# Patient Record
Sex: MALE | Race: WHITE | Employment: OTHER | URBAN - METROPOLITAN AREA
[De-identification: names, ages, dates, MRNs, and addresses within clinical notes are randomized per-mention and may not be internally consistent; named-entity substitution may affect disease eponyms.]

---

## 2017-06-30 ENCOUNTER — HOSPITAL ENCOUNTER (OUTPATIENT)
Age: 72
Setting detail: OUTPATIENT SURGERY
Discharge: HOME | End: 2017-06-30
Attending: INTERNAL MEDICINE | Admitting: INTERNAL MEDICINE
Payer: MEDICARE

## 2017-06-30 ENCOUNTER — ANESTHESIA (OUTPATIENT)
Dept: ENDOSCOPY | Age: 72
End: 2017-06-30
Payer: MEDICARE

## 2017-06-30 ENCOUNTER — ANESTHESIA EVENT (OUTPATIENT)
Dept: ENDOSCOPY | Age: 72
End: 2017-06-30
Payer: MEDICARE

## 2017-06-30 VITALS
TEMPERATURE: 98.1 F | HEIGHT: 63 IN | WEIGHT: 122.2 LBS | OXYGEN SATURATION: 98 % | SYSTOLIC BLOOD PRESSURE: 118 MMHG | HEART RATE: 70 BPM | RESPIRATION RATE: 24 BRPM | DIASTOLIC BLOOD PRESSURE: 70 MMHG | BODY MASS INDEX: 21.65 KG/M2

## 2017-06-30 PROCEDURE — 76060000031 HC ANESTHESIA FIRST 0.5 HR: Performed by: INTERNAL MEDICINE

## 2017-06-30 PROCEDURE — 77030013992 HC SNR POLYP ENDOSC BSC -B: Performed by: INTERNAL MEDICINE

## 2017-06-30 PROCEDURE — 88305 TISSUE EXAM BY PATHOLOGIST: CPT | Performed by: INTERNAL MEDICINE

## 2017-06-30 PROCEDURE — 74011250636 HC RX REV CODE- 250/636

## 2017-06-30 PROCEDURE — 76040000019: Performed by: INTERNAL MEDICINE

## 2017-06-30 PROCEDURE — 74011000250 HC RX REV CODE- 250

## 2017-06-30 PROCEDURE — 74011250636 HC RX REV CODE- 250/636: Performed by: INTERNAL MEDICINE

## 2017-06-30 RX ORDER — MIDAZOLAM HYDROCHLORIDE 1 MG/ML
.25-5 INJECTION, SOLUTION INTRAMUSCULAR; INTRAVENOUS
Status: DISCONTINUED | OUTPATIENT
Start: 2017-06-30 | End: 2017-06-30 | Stop reason: HOSPADM

## 2017-06-30 RX ORDER — ATROPINE SULFATE 0.1 MG/ML
0.5 INJECTION INTRAVENOUS
Status: DISCONTINUED | OUTPATIENT
Start: 2017-06-30 | End: 2017-06-30 | Stop reason: HOSPADM

## 2017-06-30 RX ORDER — NALOXONE HYDROCHLORIDE 0.4 MG/ML
0.4 INJECTION, SOLUTION INTRAMUSCULAR; INTRAVENOUS; SUBCUTANEOUS
Status: DISCONTINUED | OUTPATIENT
Start: 2017-06-30 | End: 2017-06-30 | Stop reason: HOSPADM

## 2017-06-30 RX ORDER — SODIUM CHLORIDE 0.9 % (FLUSH) 0.9 %
5-10 SYRINGE (ML) INJECTION EVERY 8 HOURS
Status: DISCONTINUED | OUTPATIENT
Start: 2017-06-30 | End: 2017-06-30 | Stop reason: HOSPADM

## 2017-06-30 RX ORDER — SODIUM CHLORIDE 0.9 % (FLUSH) 0.9 %
5-10 SYRINGE (ML) INJECTION AS NEEDED
Status: DISCONTINUED | OUTPATIENT
Start: 2017-06-30 | End: 2017-06-30 | Stop reason: HOSPADM

## 2017-06-30 RX ORDER — LIDOCAINE HYDROCHLORIDE 20 MG/ML
INJECTION, SOLUTION EPIDURAL; INFILTRATION; INTRACAUDAL; PERINEURAL AS NEEDED
Status: DISCONTINUED | OUTPATIENT
Start: 2017-06-30 | End: 2017-06-30 | Stop reason: HOSPADM

## 2017-06-30 RX ORDER — SODIUM CHLORIDE 9 MG/ML
75 INJECTION, SOLUTION INTRAVENOUS CONTINUOUS
Status: DISCONTINUED | OUTPATIENT
Start: 2017-06-30 | End: 2017-06-30 | Stop reason: HOSPADM

## 2017-06-30 RX ORDER — FLUMAZENIL 0.1 MG/ML
0.2 INJECTION INTRAVENOUS
Status: DISCONTINUED | OUTPATIENT
Start: 2017-06-30 | End: 2017-06-30 | Stop reason: HOSPADM

## 2017-06-30 RX ORDER — EPINEPHRINE 0.1 MG/ML
1 INJECTION INTRACARDIAC; INTRAVENOUS
Status: DISCONTINUED | OUTPATIENT
Start: 2017-06-30 | End: 2017-06-30 | Stop reason: HOSPADM

## 2017-06-30 RX ORDER — PROPOFOL 10 MG/ML
INJECTION, EMULSION INTRAVENOUS AS NEEDED
Status: DISCONTINUED | OUTPATIENT
Start: 2017-06-30 | End: 2017-06-30 | Stop reason: HOSPADM

## 2017-06-30 RX ORDER — DEXTROMETHORPHAN/PSEUDOEPHED 2.5-7.5/.8
1.2 DROPS ORAL
Status: DISCONTINUED | OUTPATIENT
Start: 2017-06-30 | End: 2017-06-30 | Stop reason: HOSPADM

## 2017-06-30 RX ADMIN — PROPOFOL 240 MG: 10 INJECTION, EMULSION INTRAVENOUS at 15:50

## 2017-06-30 RX ADMIN — LIDOCAINE HYDROCHLORIDE 40 MG: 20 INJECTION, SOLUTION EPIDURAL; INFILTRATION; INTRACAUDAL; PERINEURAL at 15:33

## 2017-06-30 RX ADMIN — SODIUM CHLORIDE 75 ML/HR: 900 INJECTION, SOLUTION INTRAVENOUS at 15:01

## 2017-06-30 NOTE — IP AVS SNAPSHOT
[address] [facility] 
[phone] Patient: Sarah Worthington MRN: [id number] :1945 You are allergic to the following Allergen Reactions Codeine Other (comments) Keeps him awake Recent Documentation Height Weight BMI Smoking Status 1.6 m 55.4 kg 21.65 kg/m2 Former Smoker Emergency Contacts Name Discharge Info Relation Home Work Mobile Jenny Villareal DISCHARGE CAREGIVER [3] Spouse [3]   [phone] About your hospitalization You were admitted on:  2017 You last received care in the:  [facility] ENDOSCOPY You were discharged on:  2017 Unit phone number:  [phone] Why you were hospitalized Your primary diagnosis was:  Not on File Providers Seen During Your Hospitalizations Provider Role Specialty Primary office phone Gracy Pathak MD Attending Provider Gastroenterology [phone] Your Primary Care Physician (PCP) Primary Care Physician Office Phone Office Fax Rola Jamison [phone] Follow-up Information None Current Discharge Medication List  
  
CONTINUE these medications which have NOT CHANGED Dose & Instructions Dispensing Information Comments Morning Noon Evening Bedtime  
 albuterol 90 mcg/actuation inhaler Commonly known as:  PROVENTIL HFA, VENTOLIN HFA, PROAIR HFA Your last dose was: Your next dose is:    
   
   
 Dose:  1 Puff Take 1 Puff by inhalation every four (4) hours as needed for Wheezing. Refills:  0  
     
   
   
   
  
 fluticasone-salmeterol 500-50 mcg/dose diskus inhaler Commonly known as:  ADVAIR Your last dose was: Your next dose is:    
   
   
 Dose:  1 Puff Take 1 Puff by inhalation every twelve (12) hours. Refills:  0  
     
   
   
   
  
 lisinopril 10 mg tablet Commonly known as:  Bhakti Husbands Your last dose was: Your next dose is:    
   
   
 Dose:  10 mg Take 10 mg by mouth daily. Refills:  0 MUCINEX DM PO Your last dose was: Your next dose is: Take  by mouth daily. Refills:  0  
     
   
   
   
  
 pravastatin 20 mg tablet Commonly known as:  PRAVACHOL Your last dose was: Your next dose is:    
   
   
 Dose:  20 mg Take 20 mg by mouth nightly. Refills:  0  
     
   
   
   
  
 primidone 250 mg tablet Commonly known as: MYSOLINE Your last dose was: Your next dose is:    
   
   
 Dose:  1000 mg Take 1,000 mg by mouth daily. Refills:  0 SINGULAIR 10 mg tablet Generic drug:  montelukast  
   
Your last dose was: Your next dose is:    
   
   
 Dose:  10 mg Take 10 mg by mouth daily. Refills:  0  
     
   
   
   
  
 tiotropium 18 mcg inhalation capsule Commonly known as:  Christella Distel Your last dose was: Your next dose is:    
   
   
 Dose:  1 Cap Take 1 Cap by inhalation daily. Refills:  0 Discharge Instructions [place] Office: [phone] [facility] 
[id number] 
1945 EGD/COLONOSCOPY DISCHARGE INSTRUCTIONS Discomfort: 
 
redness at IV site- apply warm compress to area; if redness or soreness persist- contact your physician Gaseous discomfort- walking, belching will help relieve any discomfort You may not operate a vehicle for 12 hours You may not engage in an occupation involving machinery or appliances for rest of today. You may not drink alcoholic beverages for at least 12 hours Avoid making any critical decisions for at least 24 hour DIET You may resume your regular diet  however -  remember your colon is empty and a heavy meal will produce gas. Avoid these foods:  fried / greasy foods, excessive carbonated drinks or too much caffeine MEDICATIONS Regarding Aspirin or Nonsteroidal medications specifically, please see below. ACTIVITY You may resume your normal daily activities. Spend the remainder of the day resting -  avoid any strenuous activity. CALL M.D. ANY SIGN OF Increasing pain, nausea, vomiting Abdominal distension (swelling) New increased bleeding (oral or rectal) Fever (chills) You may not take any Advil, Aspirin, Ibuprofen, Motrin, Aleve, or Goodys for 7 days, ONLY  Tylenol as needed for pain. Follow-up Instructions: 
 Call  Kael Arango MD for any questions or concerns Results of procedure / biopsy in 7 days Telephone # [phone] Follow-up Information None MailPix[employer] Activation Thank you for requesting access to ArriveBefore. Please follow the instructions below to securely access and download your online medical record. ArriveBefore allows you to send messages to your doctor, view your test results, renew your prescriptions, schedule appointments, and more. How Do I Sign Up? 1. In your internet browser, go to [email or web address] 
2. Click on the First Time User? Click Here link in the Sign In box. You will be redirect to the New Member Sign Up page. 3. Enter your ArriveBefore Access Code exactly as it appears below. You will not need to use this code after youve completed the sign-up process. If you do not sign up before the expiration date, you must request a new code. ArriveBefore Access Code: [email or web address] Expires: 2017  2:27 PM (This is the date your ArriveBefore access code will ) 4. Enter the last four digits of your Social Security Number (xxxx) and Date of Birth (mm/dd/yyyy) as indicated and click Submit. You will be taken to the next sign-up page. 5. Create a ArriveBefore ID. This will be your ArriveBefore login ID and cannot be changed, so think of one that is secure and easy to remember. 6. Create a [employer] password. You can change your password at any time. 7. Enter your Password Reset Question and Answer. This can be used at a later time if you forget your password. 8. Enter your e-mail address. You will receive e-mail notification when new information is available in [address] 9. Click Sign Up. You can now view and download portions of your medical record. 10. Click the Download Summary menu link to download a portable copy of your medical information. Additional Information If you have questions, please visit the Frequently Asked Questions section of the [employer] website at [email or web address] Remember, [employer] is NOT to be used for urgent needs. For medical emergencies, dial 911. Discharge Orders None Introducing [facility]! Paulo Abraham introduces [employer] patient portal. Now you can access parts of your medical record, email your doctor's office, and request medication refills online. 1. In your internet browser, go to [email or web address] 2. Click on the First Time User? Click Here link in the Sign In box. You will see the New Member Sign Up page. 3. Enter your [employer] Access Code exactly as it appears below. You will not need to use this code after youve completed the sign-up process. If you do not sign up before the expiration date, you must request a new code. · [employer] Access Code: [email or web address] Expires: 9/28/2017  2:27 PM 
 
4. Enter the last four digits of your Social Security Number (xxxx) and Date of Birth (mm/dd/yyyy) as indicated and click Submit. You will be taken to the next sign-up page. 5. Create a [employer] ID. This will be your [employer] login ID and cannot be changed, so think of one that is secure and easy to remember. 6. Create a [employer] password. You can change your password at any time. 7. Enter your Password Reset Question and Answer.  This can be used at a later time if you forget your password. 8. Enter your e-mail address. You will receive e-mail notification when new information is available in [address] 9. Click Sign Up. You can now view and download portions of your medical record. 10. Click the Download Summary menu link to download a portable copy of your medical information. If you have questions, please visit the Frequently Asked Questions section of the [employer] website. Remember, [employer] is NOT to be used for urgent needs. For medical emergencies, dial 911. Now available from your iPhone and Android! General Information Please provide this summary of care documentation to your next provider. Patient Signature:  ____________________________________________________________ Date:  ____________________________________________________________  
  
[employer] Provider Signature:  ____________________________________________________________ Date:  ____________________________________________________________

## 2017-06-30 NOTE — ANESTHESIA POSTPROCEDURE EVALUATION
Post-Anesthesia Evaluation and Assessment    Patient: Srinivas Gay MRN: [id number]  SSN: [id number]    YOB: 1945  Age: 80 y.o. Sex: male       Cardiovascular Function/Vital Signs  Visit Vitals    /83    Pulse 75    Temp 36.7 °C (98.1 °F)    Resp 24    Ht 5' 3\" (1.6 m)    Wt 55.4 kg (122 lb 3.2 oz)    SpO2 98%    BMI 21.65 kg/m2       Patient is status post total IV anesthesia anesthesia for Procedure(s):  COLONOSCOPY  ENDOSCOPIC POLYPECTOMY. Nausea/Vomiting: None    Postoperative hydration reviewed and adequate. Pain:  Pain Scale 1: Visual (06/30/17 1610)  Pain Intensity 1: 0 (06/30/17 1610)   Managed    Neurological Status: At baseline    Mental Status and Level of Consciousness: Arousable    Pulmonary Status:   O2 Device: Room air (06/30/17 1610)   Adequate oxygenation and airway patent    Complications related to anesthesia: None    Post-anesthesia assessment completed.  No concerns    Signed By: Ana Hollingsworth DO     June 30, 2017

## 2017-06-30 NOTE — DISCHARGE INSTRUCTIONS
[place] Office: [phone]    [place]  [id number]  1945    EGD/COLONOSCOPY DISCHARGE INSTRUCTIONS  Discomfort:    redness at IV site- apply warm compress to area; if redness or soreness persist- contact your physician  Gaseous discomfort- walking, belching will help relieve any discomfort  You may not operate a vehicle for 12 hours  You may not engage in an occupation involving machinery or appliances for rest of today. You may not drink alcoholic beverages for at least 12 hours  Avoid making any critical decisions for at least 24 hour  DIET  You may resume your regular diet - however -  remember your colon is empty and a heavy meal will produce gas. Avoid these foods:  fried / greasy foods, excessive carbonated drinks or too much caffeine  MEDICATIONS   Regarding Aspirin or Nonsteroidal medications specifically, please see below. ACTIVITY  You may resume your normal daily activities. Spend the remainder of the day resting -  avoid any strenuous activity. CALL M.D. ANY SIGN OF   Increasing pain, nausea, vomiting  Abdominal distension (swelling)  New increased bleeding (oral or rectal)  Fever (chills)    You may not take any Advil, Aspirin, Ibuprofen, Motrin, Aleve, or Goodys for 7 days, ONLY  Tylenol as needed for pain. Follow-up Instructions:   Call  Mubashir A. Jerris Sever, MD for any questions or concerns  Results of procedure / biopsy in 7 days   Telephone # [phone]      Follow-up Information     None      MediaSpike Activation    Thank you for requesting access to MediaSpike. Please follow the instructions below to securely access and download your online medical record. MediaSpike allows you to send messages to your doctor, view your test results, renew your prescriptions, schedule appointments, and more. How Do I Sign Up? 1. In your internet browser, go to [email or web address]  2. Click on the First Time User? Click Here link in the Sign In box.  You will be redirect to the New Member Sign Up page. 3. Enter your ReliOn Access Code exactly as it appears below. You will not need to use this code after youve completed the sign-up process. If you do not sign up before the expiration date, you must request a new code. ReliOn Access Code: [email or web address]  Expires: 2017  2:27 PM (This is the date your ReliOn access code will )    4. Enter the last four digits of your Social Security Number (xxxx) and Date of Birth (mm/dd/yyyy) as indicated and click Submit. You will be taken to the next sign-up page. 5. Create a ReliOn ID. This will be your ReliOn login ID and cannot be changed, so think of one that is secure and easy to remember. 6. Create a ReliOn password. You can change your password at any time. 7. Enter your Password Reset Question and Answer. This can be used at a later time if you forget your password. 8. Enter your e-mail address. You will receive e-mail notification when new information is available in [address] 9. Click Sign Up. You can now view and download portions of your medical record. 10. Click the Download Summary menu link to download a portable copy of your medical information. Additional Information    If you have questions, please visit the Frequently Asked Questions section of the ReliOn website at [email or web address] Remember, ReliOn is NOT to be used for urgent needs. For medical emergencies, dial 911.

## 2017-06-30 NOTE — ANESTHESIA PREPROCEDURE EVALUATION
Anesthetic History   No history of anesthetic complications            Review of Systems / Medical History  Patient summary reviewed, nursing notes reviewed and pertinent labs reviewed    Pulmonary    COPD: severe      Smoker (Former smoker, quit in 2005)      Comments: Hx of chronic resp failure with hypoxia, home oxygen 2 L, does not use unless needed   Neuro/Psych   Within defined limits           Cardiovascular    Hypertension          CAD and hyperlipidemia    Exercise tolerance: <4 METS  Comments: 4-12-16 EKG:  NSR, LAD    Denies any chest pain or hrt prob recently.   Sees Cardiologist regularly   GI/Hepatic/Renal               Comments: Occult blood Endo/Other  Within defined limits           Other Findings            Physical Exam    Airway  Mallampati: I  TM Distance: 4 - 6 cm  Neck ROM: normal range of motion   Mouth opening: Normal     Cardiovascular    Rhythm: regular  Rate: normal         Dental    Dentition: Full upper dentures and Lower partial plate  Comments: Lower plate snaps in place   Pulmonary  Breath sounds clear to auscultation               Abdominal  GI exam deferred       Other Findings            Anesthetic Plan    ASA: 3  Anesthesia type: total IV anesthesia          Induction: Intravenous  Anesthetic plan and risks discussed with: Patient      Took BP med and used inhalers today

## 2017-06-30 NOTE — PROCEDURES
Colonoscopy Procedure Note    Nate Elizabeth Sr.  1945  [id number]    Indications:    Occult blood in stool     Post-operative Diagnosis:  Colon polyps, internal hemorrhoids, diverticulosis    : Mubashir A. Melody Baumgarten, MD    Referring Provider: Kathrine Berg MD    Sedation:  MAC anesthesia Propofol        Procedure Details:    After detailed informed consent was obtained with all risks and benefits of procedure explained and preoperative exam completed, the patient was taken to the endoscopy suite and placed in the left lateral decubitus position. Upon sequential sedation as per above, a digital rectal exam was performed  And was normal.  The Olympus videocolonoscope  was inserted in the rectum and carefully advanced to the cecum, which was identified by the ileocecal valve and appendiceal orifice. The quality of preparation was good. The colonoscope was slowly withdrawn with careful evaluation between folds. Retroflexion in the rectum could not be performed(short rectum). Findings:     · A 1 cm transverse colon sessile polyp was snared off with heat. · A 5 mm cecal polyp was removed with a hot snare  · An 8 mm descending colon polyp was removed with a hot snare. · Severe sigmoid diverticulosis is noted  · Large internal hemorrhoids  · TI was normal      Therapies:  3 complete polypectomy were performed using hot snare  and the polyps were  retrieved    Specimen:  Specimens were collected as described above and sent to pathology. Complications: None were encountered during the procedure. EBL:  None. Recommendations:     -Await pathology. -Repeat colonoscopy in 3 years.  -Annual FOB Testing suggested  -Follow up with primary care physician.  -For new bleeding, unexplained weight loss,change in bowel habits and anemia, an earlier colonoscopy should be considered. Mubashir A. Melody Baumgarten, MD  6/30/2017  3:52 PM

## 2017-06-30 NOTE — H&P
Pre-endoscopy H and P    The patient was seen and examined in the room/pre-op holding area. The airway was assessed and documented. The problem list, past medical history, and medications were reviewed. Patient Active Problem List   Diagnosis Code    Pulmonary hypertension ([facility] I27.2    ASHD (arteriosclerotic heart disease) I25.10    Chronic airway obstruction, not elsewhere classified J44.9    Acute pancreatitis K85.90    HTN (hypertension) I10    COPD (chronic obstructive pulmonary disease) ([facility] J44.9    Chronic respiratory failure with hypoxia (HCC) J96.11    Hyperlipidemia E78.5     Social History     Social History    Marital status:      Spouse name: N/A    Number of children: N/A    Years of education: N/A     Occupational History    Not on file. Social History Main Topics    Smoking status: Former Smoker     Packs/day: 1.00     Quit date: 6/29/2005    Smokeless tobacco: Never Used    Alcohol use No    Drug use: No    Sexual activity: Not on file     Other Topics Concern    Not on file     Social History Narrative     Past Medical History:   Diagnosis Date    COPD     Hypertension     Ill-defined condition     oxygen as needed--2 liters         Prior to Admission Medications   Prescriptions Last Dose Informant Patient Reported? Taking? GUAIFENESIN/DEXTROMETHORPHAN (MUCINEX DM PO) Unknown at Unknown time  Yes No   Sig: Take  by mouth daily. albuterol (PROVENTIL HFA, VENTOLIN HFA, PROAIR HFA) 90 mcg/actuation inhaler 6/23/2017 at Unknown time  Yes Yes   Sig: Take 1 Puff by inhalation every four (4) hours as needed for Wheezing. fluticasone-salmeterol (ADVAIR) 500-50 mcg/dose diskus inhaler 6/30/2017 at Unknown time Self Yes Yes   Sig: Take 1 Puff by inhalation every twelve (12) hours. lisinopril (PRINIVIL, ZESTRIL) 10 mg tablet 6/29/2017 at Unknown time  Yes Yes   Sig: Take 10 mg by mouth daily.    montelukast (SINGULAIR) 10 mg tablet 6/29/2017 at Unknown time  Yes Yes   Sig: Take 10 mg by mouth daily. pravastatin (PRAVACHOL) 20 mg tablet 6/29/2017 at Unknown time  Yes Yes   Sig: Take 20 mg by mouth nightly. primidone (MYSOLINE) 250 mg tablet 6/29/2017 at Unknown time  Yes Yes   Sig: Take 1,000 mg by mouth daily. tiotropium (SPIRIVA) 18 mcg inhalation capsule 6/29/2017 at Unknown time  Yes Yes   Sig: Take 1 Cap by inhalation daily. Facility-Administered Medications: None           The review of systems is:  Negative  for shortness of breath or chest pain      The heart, lungs, and mental status were satisfactory for the administration of deep sedation and for the procedure. I discussed with the patient the objectives, risks, consequences and alternatives to the procedure.       Magalie Lyn MD  6/30/2017  3:29 PM

## 2017-06-30 NOTE — PROGRESS NOTES
Anesthesia reports 240 mg Propofol, 40 mg Lidocaine and 750 ml of Normal Saline were given during procedure. Received report from anesthesia staff on vital signs and status of patient.

## 2018-01-23 ENCOUNTER — HOSPITAL ENCOUNTER (OUTPATIENT)
Dept: GENERAL RADIOLOGY | Age: 73
Discharge: HOME | End: 2018-01-23
Payer: MEDICARE

## 2018-01-23 DIAGNOSIS — J44.9 CHRONIC OBSTRUCTIVE PULMONARY DISEASE (COPD) (HCC): ICD-10-CM

## 2018-01-23 PROCEDURE — 71046 X-RAY EXAM CHEST 2 VIEWS: CPT

## 2018-01-30 ENCOUNTER — HOSPITAL ENCOUNTER (OUTPATIENT)
Dept: CARDIAC CATH/INVASIVE PROCEDURES | Age: 73
Discharge: HOME | End: 2018-01-30
Attending: INTERNAL MEDICINE | Admitting: INTERNAL MEDICINE
Payer: MEDICARE

## 2018-01-30 VITALS
HEIGHT: 62 IN | SYSTOLIC BLOOD PRESSURE: 168 MMHG | OXYGEN SATURATION: 92 % | RESPIRATION RATE: 24 BRPM | WEIGHT: 133 LBS | HEART RATE: 89 BPM | TEMPERATURE: 98 F | DIASTOLIC BLOOD PRESSURE: 98 MMHG | BODY MASS INDEX: 24.48 KG/M2

## 2018-01-30 PROCEDURE — 77030004532 HC CATH ANGI DX IMP BSC -A

## 2018-01-30 PROCEDURE — 77030019569 HC BND COMPR RAD TERU -B

## 2018-01-30 PROCEDURE — 74011636320 HC RX REV CODE- 636/320: Performed by: INTERNAL MEDICINE

## 2018-01-30 PROCEDURE — 77030013744

## 2018-01-30 PROCEDURE — 77030010221 HC SPLNT WR POS TELE -B

## 2018-01-30 PROCEDURE — C1894 INTRO/SHEATH, NON-LASER: HCPCS

## 2018-01-30 PROCEDURE — 77030004533 HC CATH ANGI DX IMP BSC -B

## 2018-01-30 PROCEDURE — 74011250636 HC RX REV CODE- 250/636: Performed by: INTERNAL MEDICINE

## 2018-01-30 PROCEDURE — 74011000250 HC RX REV CODE- 250: Performed by: INTERNAL MEDICINE

## 2018-01-30 PROCEDURE — 99153 MOD SED SAME PHYS/QHP EA: CPT

## 2018-01-30 PROCEDURE — C1769 GUIDE WIRE: HCPCS

## 2018-01-30 RX ORDER — LIDOCAINE HYDROCHLORIDE 10 MG/ML
10 INJECTION INFILTRATION; PERINEURAL
Status: DISCONTINUED | OUTPATIENT
Start: 2018-01-30 | End: 2018-01-30

## 2018-01-30 RX ORDER — NITROGLYCERIN 0.4 MG/1
0.4 TABLET SUBLINGUAL AS NEEDED
Status: DISCONTINUED | OUTPATIENT
Start: 2018-01-30 | End: 2018-01-30

## 2018-01-30 RX ORDER — HEPARIN SODIUM 200 [USP'U]/100ML
1000 INJECTION, SOLUTION INTRAVENOUS AS NEEDED
Status: DISCONTINUED | OUTPATIENT
Start: 2018-01-30 | End: 2018-01-30

## 2018-01-30 RX ORDER — VERAPAMIL HYDROCHLORIDE 2.5 MG/ML
2.5 INJECTION, SOLUTION INTRAVENOUS
Status: DISCONTINUED | OUTPATIENT
Start: 2018-01-30 | End: 2018-01-30

## 2018-01-30 RX ORDER — SODIUM CHLORIDE 9 MG/ML
1.5 INJECTION, SOLUTION INTRAVENOUS CONTINUOUS
Status: DISPENSED | OUTPATIENT
Start: 2018-01-30 | End: 2018-01-30

## 2018-01-30 RX ORDER — FENTANYL CITRATE 50 UG/ML
25-100 INJECTION, SOLUTION INTRAMUSCULAR; INTRAVENOUS
Status: DISCONTINUED | OUTPATIENT
Start: 2018-01-30 | End: 2018-01-30

## 2018-01-30 RX ORDER — HEPARIN SODIUM 1000 [USP'U]/ML
1000-5000 INJECTION, SOLUTION INTRAVENOUS; SUBCUTANEOUS AS NEEDED
Status: DISCONTINUED | OUTPATIENT
Start: 2018-01-30 | End: 2018-01-30

## 2018-01-30 RX ORDER — SODIUM CHLORIDE 9 MG/ML
3 INJECTION, SOLUTION INTRAVENOUS CONTINUOUS
Status: DISCONTINUED | OUTPATIENT
Start: 2018-01-30 | End: 2018-01-30

## 2018-01-30 RX ORDER — ASPIRIN 81 MG/1
81 TABLET ORAL DAILY
COMMUNITY
End: 2018-04-23 | Stop reason: CLARIF

## 2018-01-30 RX ORDER — MIDAZOLAM HYDROCHLORIDE 1 MG/ML
.5-5 INJECTION, SOLUTION INTRAMUSCULAR; INTRAVENOUS
Status: DISCONTINUED | OUTPATIENT
Start: 2018-01-30 | End: 2018-01-30

## 2018-01-30 RX ORDER — ATROPINE SULFATE 0.1 MG/ML
1 INJECTION INTRAVENOUS AS NEEDED
Status: DISCONTINUED | OUTPATIENT
Start: 2018-01-30 | End: 2018-01-30

## 2018-01-30 RX ORDER — SODIUM CHLORIDE 0.9 % (FLUSH) 0.9 %
5-10 SYRINGE (ML) INJECTION AS NEEDED
Status: DISCONTINUED | OUTPATIENT
Start: 2018-01-30 | End: 2018-01-30

## 2018-01-30 RX ORDER — ACETAMINOPHEN 325 MG/1
650 TABLET ORAL
Status: DISCONTINUED | OUTPATIENT
Start: 2018-01-30 | End: 2018-01-30 | Stop reason: HOSPADM

## 2018-01-30 RX ADMIN — SODIUM CHLORIDE 3 ML/KG/HR: 900 INJECTION, SOLUTION INTRAVENOUS at 10:30

## 2018-01-30 RX ADMIN — LIDOCAINE HYDROCHLORIDE 3 ML: 10 INJECTION, SOLUTION INFILTRATION; PERINEURAL at 11:11

## 2018-01-30 RX ADMIN — HEPARIN SODIUM 2500 UNITS: 1000 INJECTION, SOLUTION INTRAVENOUS; SUBCUTANEOUS at 11:15

## 2018-01-30 RX ADMIN — VERAPAMIL HYDROCHLORIDE 5 MG: 2.5 INJECTION, SOLUTION INTRAVENOUS at 11:15

## 2018-01-30 RX ADMIN — SODIUM CHLORIDE 1.5 ML/KG/HR: 900 INJECTION, SOLUTION INTRAVENOUS at 11:29

## 2018-01-30 RX ADMIN — Medication 2000 UNITS: at 10:58

## 2018-01-30 RX ADMIN — IOPAMIDOL 75 ML: 755 INJECTION, SOLUTION INTRAVENOUS at 11:38

## 2018-01-30 RX ADMIN — FENTANYL CITRATE 50 MCG: 50 INJECTION, SOLUTION INTRAMUSCULAR; INTRAVENOUS at 11:04

## 2018-01-30 RX ADMIN — MIDAZOLAM HYDROCHLORIDE 2 MG: 1 INJECTION, SOLUTION INTRAMUSCULAR; INTRAVENOUS at 11:04

## 2018-01-30 NOTE — PROGRESS NOTES
Cardiac Cath Lab Procedure Area Arrival Note:    Leah Hannah arrived to Cardiac Cath Lab, Procedure Area. Patient identifiers verified with NAME and DATE OF BIRTH. Procedure verified with patient. Consent forms verified. Allergies verified. Patient informed of procedure and plan of care. Questions answered with review. Patient voiced understanding of procedure and plan of care. Patient on cardiac monitor, non-invasive blood pressure, SPO2 monitor. On room air then placed on O2 @ 2 lpm via NC.  IV of normal saline on pump at 181 ml/hr. Patient status doing well without problems. Patient is A&Ox 4. Patient reports no pain. Patient medicated during procedure with orders obtained and verified by Dr. Shell Meier. Refer to patients Cardiac Cath Lab PROCEDURE REPORT for vital signs, assessment, status, and response during procedure, printed at end of case. Printed report on chart or scanned into chart.

## 2018-01-30 NOTE — PROCEDURES
Cardiac Catheterization Procedure Note   Patient: Grzegorz Cheatham MRN: [id number]  SSN: [id number]   YOB: 1945 Age: 71 y.o.   Sex: male    Date of Procedure: 1/30/2018   Pre-procedure Diagnosis: CAD, SOB  Post-procedure Diagnosis: Coronary Artery Disease  Procedure: Left Heart Cath, Coronary angiography, LV angiography, moderate sedation  :  Dr. Sanju Aguilar MD    Assistant(s):  None  Anesthesia: Moderate Sedation   Estimated Blood Loss: Less than 10 mL   Specimens Removed: None  Findings: Nonobstructive CAD, normal LVEF, mildly elevated LVEDP  Complications: None   Implants:  None  Signed by:  Sanju Aguilar MD  1/30/2018  12:15 PM

## 2018-01-30 NOTE — PROGRESS NOTES
Cardiac Cath Lab Recovery Arrival Note:      Raymond Babinski arrived to Cardiac Cath Lab, Recovery Area. Staff introduced to patient. Patient identifiers verified with NAME and DATE OF BIRTH. Procedure verified with patient. Consent forms reviewed and signed by patient or authorized representative and verified. Allergies verified. Patient and family oriented to department. Patient and family informed of procedure and plan of care. Questions answered with review. Patient prepped for procedure, per orders from physician, prior to arrival.    Patient on cardiac monitor, non-invasive blood pressure, SPO2 monitor. On room air . Patient is A&Ox 4. Patient reports no chest pain. Patient in stretcher, in low position, with side rails up, call bell within reach, patient instructed to call if assistance as needed. Patient prep in: [address], [place].    Patient family has pager # [phone]  Family in: wife in hospital.   Prep by: Anthony Stoner RN

## 2018-01-30 NOTE — IP AVS SNAPSHOT
[facility] [facility] [address] [place] [address] 
[phone] Patient: Hernandez Morrow. MRN: [id number] :1945 About your hospitalization You were admitted on:  2018 You last received care in the:  [address] [facility], [facility]/s  [facility] You were discharged on:  2018 Why you were hospitalized Your primary diagnosis was:  Not on File Follow-up Information Follow up With Details Comments Contact Info Gala Todd MD   [facility] [place] [address] 
[phone] Ector Alfaro MD  keep next appointment with Dr Jasmyne Kaur [address] [place] [address] 
[phone] Discharge Orders None A check arslan indicates which time of day the medication should be taken. My Medications CONTINUE taking these medications Instructions Each Dose to Equal  
 Morning Noon Evening Bedtime  
 albuterol 90 mcg/actuation inhaler Commonly known as:  PROVENTIL HFA, VENTOLIN HFA, PROAIR HFA Your last dose was: Your next dose is: Take 1 Puff by inhalation every four (4) hours as needed for Wheezing. 1 Puff  
    
   
   
   
  
 aspirin delayed-release 81 mg tablet Your last dose was: Your next dose is: Take 81 mg by mouth daily. 81 mg  
    
   
   
   
  
 fluticasone-salmeterol 500-50 mcg/dose diskus inhaler Commonly known as:  ADVAIR Your last dose was: Your next dose is: Take 1 Puff by inhalation every twelve (12) hours. 1 Puff MUCINEX DM PO Your last dose was: Your next dose is: Take  by mouth daily. OTHER Your last dose was: Your next dose is:    
   
   
 2 Puffs two (2) times a day. 2 Puff  
    
   
   
   
  
 pravastatin 20 mg tablet Commonly known as:  PRAVACHOL  
   
 Your last dose was: Your next dose is: Take 40 mg by mouth nightly. 40 mg  
    
   
   
   
  
 primidone 250 mg tablet Commonly known as: MYSOLINE Your last dose was: Your next dose is: Take 1,000 mg by mouth daily. 1000 mg SINGULAIR 10 mg tablet Generic drug:  montelukast  
   
Your last dose was: Your next dose is: Take 10 mg by mouth daily. 10 mg  
    
   
   
   
  
 tiotropium 18 mcg inhalation capsule Commonly known as:  Cary Rota Your last dose was: Your next dose is: Take 1 Cap by inhalation daily. 1 Cap Discharge Instructions None Introducing [facility] SERVICES! [employer] introduces TrustCloud patient portal. Now you can access parts of your medical record, email your doctor's office, and request medication refills online. 1. In your internet browser, go to [email or web address]. [email or web address] 2. Click on the First Time User? Click Here link in the Sign In box. You will see the New Member Sign Up page. 3. Enter your TrustCloud Access Code exactly as it appears below. You will not need to use this code after youve completed the sign-up process. If you do not sign up before the expiration date, you must request a new code. · TrustCloud Access Code: [email or web address] Expires: 4/29/2018 10:54 AM 
 
4. Enter the last four digits of your Social Security Number (xxxx) and Date of Birth (mm/dd/yyyy) as indicated and click Submit. You will be taken to the next sign-up page. 5. Create a TrustCloud ID. This will be your TrustCloud login ID and cannot be changed, so think of one that is secure and easy to remember. 6. Create a TrustCloud password. You can change your password at any time. 7. Enter your Password Reset Question and Answer. This can be used at a later time if you forget your password. 8. Enter your e-mail address. You will receive e-mail notification when new information is available in [address] 9. Click Sign Up. You can now view and download portions of your medical record. 10. Click the Download Summary menu link to download a portable copy of your medical information. If you have questions, please visit the Frequently Asked Questions section of the ezz[employer] - how to arabia website. Remember, e[employer] how [employer] arabia is NOT to be used for urgent needs. For medical emergencies, dial 911. Now available from your iPhone and Android! Providers Seen During Your Hospitalization Provider Specialty Primary office phone Luis Braun MD Cardiology [phone] Your Primary Care Physician (PCP) Primary Care Physician Office Phone Office Fax Karina Ray [phone] You are allergic to the following Allergen Reactions Codeine Other (comments) Keeps him awake Recent Documentation Height Weight BMI Smoking Status 1.575 m 60.3 kg 24.33 kg/m2 Former Smoker Emergency Contacts Name Discharge Info Relation Home Work Mobile Jenny Villareal DISCHARGE CAREGIVER [3] Spouse [3] [phone] Patient Belongings The following personal items are in your possession at time of discharge: 
     Visual Aid: Glasses, With patient Please provide this summary of care documentation to your next provider. Signatures-by signing, you are acknowledging that this After Visit Summary has been reviewed with you and you have received a copy. Patient Signature:  ____________________________________________________________ Date:  ____________________________________________________________  
  
[facility] Provider Signature:  ____________________________________________________________ Date:  ____________________________________________________________

## 2018-01-30 NOTE — PROGRESS NOTES
2 ml of air released from right radial TR band, no bleeding noted  1315   3 ml of air of air released from TR band, no change  1330   3 ml of air released from right radial TR band, no change  1345   2 ml of air released from TR band  1400 TR band removed, no bleeding noted. Gauze and tegaderm dsg applied    Discharge instructions reviewed with pt and wife. They are aware the pt's B/P is higher then usual, pt has had a change in his B/P meds and after talking with pt and wife they called his PCP to confirm the new med. Pt and wife want to leave now and will stop @ PCP on the way home.   1415  Ambulated 100 ft, gait steady, voided, back to stretcher  Assisted with dressing  1430 PIV d/c'd  1445  D/c'd via w/c with wife,instructions and belongings

## 2018-01-30 NOTE — PROGRESS NOTES
TRANSFER - IN REPORT:    Verbal report received from JESSIKA Abdul on [facility].  being received from procedure for routine progression of care. Report consisted of patients Situation, Background, Assessment and Recommendations(SBAR). Information from the following report(s) Procedure Summary, Intake/Output, MAR, Recent Results and Med Rec Status was reviewed with the receiving clinician. Opportunity for questions and clarification was provided. Assessment completed upon patients arrival to [address] [place] [address] and care assumed. Cardiac Cath Lab Recovery Arrival Note:    Jaren Uriarte arrived to [facility] recovery area. Patient procedure= C. Patient on cardiac monitor, non-invasive blood pressure, SPO2 monitor. On  O2 @ 2 lpm via n/c. IV  of nacl on pump at 90 ml/hr. Patient status doing well without problems. Patient is A&Ox 4. Patient reports no complaints. PROCEDURE SITE CHECK:    Procedure site:without any bleeding and or hematoma, no pain/discomfort reported at procedure site. No change in patient status. Continue to monitor patient and status.

## 2018-04-20 ENCOUNTER — OFFICE VISIT (OUTPATIENT)
Dept: SURGERY | Age: 73
End: 2018-04-20

## 2018-04-20 VITALS
HEART RATE: 90 BPM | SYSTOLIC BLOOD PRESSURE: 148 MMHG | OXYGEN SATURATION: 90 % | HEIGHT: 62 IN | WEIGHT: 132 LBS | BODY MASS INDEX: 24.29 KG/M2 | DIASTOLIC BLOOD PRESSURE: 86 MMHG

## 2018-04-20 DIAGNOSIS — Z01.818 PRE-OP TESTING: Primary | ICD-10-CM

## 2018-04-20 DIAGNOSIS — K40.90 RIGHT INGUINAL HERNIA: Primary | ICD-10-CM

## 2018-04-20 RX ORDER — HYDROCHLOROTHIAZIDE 12.5 MG/1
12.5 TABLET ORAL DAILY
Status: ON HOLD | COMMUNITY
End: 2018-11-26 | Stop reason: DRUGHIGH

## 2018-04-20 NOTE — PROGRESS NOTES
1. Have you been to the ER, urgent care clinic since your last visit? Hospitalized since your last visit? New patient2. Have you seen or consulted any other health care providers outside of the [address] [place] [address] since your last visit? Include any pap smears or colon screening. New patient    Does have advanced directive.

## 2018-04-20 NOTE — PROGRESS NOTES
HISTORY OF PRESENT ILLNESS  Jemima Robledo is a 71 y.o. male who comes in for consultation by Dr Jeannie Woodruff for a hernia  HPI  He noted right groin swelling about 2 weeks ago. It is occasionally very painful but most of the time just sore. He does not recall an inciting event. He does have COPD and does a lot of coughing. He denies associated nausea, vomiting, diarrhea, constipation, melena, hematochezia, dysuria or hematuria. The bulge reduces when laying flat. He previously had a left inguinal hernia repaired with mesh. Past Medical History:   Diagnosis Date    Calculus of kidney     COPD     Hypertension     Ill-defined condition     oxygen as needed--2 liters     Past Surgical History:   Procedure Laterality Date    COLONOSCOPY N/A 6/30/2017    COLONOSCOPY performed [facility]bet [facility]rit[facility]tt [facility] at [facility] ENDOSCOPY    HX CATARACT REMOVAL      bilateral    HX HERNIA REPAIR      HX OTHER SURGICAL      colonoscopy     Family History   Problem Relation Age of Onset    Heart Disease Brother      Social History   Substance Use Topics    Smoking status: Former Smoker     Packs/day: 1.00     Quit date: 6/29/2005    Smokeless tobacco: Never Used    Alcohol use No     Current Outpatient Prescriptions   Medication Sig    hydroCHLOROthiazide (HYDRODIURIL) 12.5 mg tablet Take 12.5 mg by mouth daily.  tiotropium (SPIRIVA) 18 mcg inhalation capsule Take 1 Cap by inhalation daily.  primidone (MYSOLINE) 250 mg tablet Take 1,000 mg by mouth daily.  fluticasone-salmeterol (ADVAIR) 500-50 mcg/dose diskus inhaler Take 1 Puff by inhalation every twelve (12) hours.  pravastatin (PRAVACHOL) 20 mg tablet Take 40 mg by mouth nightly.  montelukast (SINGULAIR) 10 mg tablet Take 10 mg by mouth daily.  aspirin delayed-release 81 mg tablet Take 81 mg by mouth daily.  OTHER 2 Puffs two (2) times a day.  GUAIFENESIN/DEXTROMETHORPHAN (MUCINEX DM PO) Take  by mouth daily.     albuterol (PROVENTIL HFA, VENTOLIN HFA, PROAIR HFA) 90 mcg/actuation inhaler Take 1 Puff by inhalation every four (4) hours as needed for Wheezing. No current facility-administered medications for this visit. Allergies   Allergen Reactions    Codeine Other (comments)     Keeps him awake       Review of Systems   Constitutional: Negative for chills, diaphoresis, fever, malaise/fatigue and weight loss. HENT: Negative for congestion, ear pain and sore throat. Eyes: Negative for blurred vision and pain. Respiratory: Positive for shortness of breath. Negative for cough, hemoptysis, sputum production, wheezing and stridor. Cardiovascular: Negative for chest pain, palpitations, orthopnea, claudication, leg swelling and PND. Gastrointestinal: Positive for abdominal pain. Negative for blood in stool, constipation, diarrhea, heartburn, melena, nausea and vomiting. Genitourinary: Negative for dysuria, flank pain, frequency, hematuria and urgency. Musculoskeletal: Negative for back pain, joint pain, myalgias and neck pain. Skin: Negative for itching and rash. Neurological: Negative for dizziness, tremors, focal weakness, seizures, weakness and headaches. Endo/Heme/Allergies: Negative for polydipsia. Psychiatric/Behavioral: Negative for depression and memory loss. The patient is not nervous/anxious. Visit Vitals    /86 (BP 1 Location: Right arm, BP Patient Position: Sitting)    Pulse 90    Ht 5' 2\" (1.575 m)    Wt 59.9 kg (132 lb)    SpO2 90%    BMI 24.14 kg/m2       Physical Exam   Constitutional: He is oriented to person, place, and time. He appears well-developed and well-nourished. No distress. HENT:   Head: Normocephalic and atraumatic. Mouth/Throat: Oropharynx is clear and moist. No oropharyngeal exudate. Eyes: Conjunctivae and EOM are normal. Pupils are equal, round, and reactive to light. No scleral icterus. Neck: Normal range of motion. Neck supple. No tracheal deviation present.  No thyromegaly present. Cardiovascular: Normal rate, regular rhythm and normal heart sounds. Exam reveals no gallop and no friction rub. No murmur heard. Pulmonary/Chest: Effort normal and breath sounds normal. No stridor. No respiratory distress. He has no wheezes. He has no rales. Abdominal: Soft. Normal appearance and bowel sounds are normal. He exhibits no distension, no pulsatile liver and no mass. There is no hepatosplenomegaly. There is no tenderness. There is no rebound, no guarding and no CVA tenderness. A hernia is present. Hernia confirmed positive in the right inguinal area (reducbile small-medium sized ?direct). Hernia confirmed negative in the ventral area and confirmed negative in the left inguinal area. Genitourinary: Testes normal and penis normal. Cremasteric reflex is present. Circumcised. Musculoskeletal: Normal range of motion. He exhibits no edema or tenderness. Lymphadenopathy:     He has no cervical adenopathy. Right: No inguinal adenopathy present. Left: No inguinal adenopathy present. Neurological: He is alert and oriented to person, place, and time. No cranial nerve deficit. Coordination normal.   Skin: Skin is warm and dry. No rash noted. He is not diaphoretic. No erythema. Psychiatric: He has a normal mood and affect. His behavior is normal. Judgment and thought content normal.       ASSESSMENT and PLAN  1. Symptomatic right inguinal hernia. I explained about the anatomy and pathophysiology of hernias and the risk of incarceration and strangulation of the bowel. I explained about hernia repairs (open with and without mesh, and robotic assisted and laparoscopic with mesh).   I explained the risks and benefits of repair including bleeding, infection, chronic pain, orchalgia, loss of testes, bowel or bladder injury, hernia recurrence, seroma, mesh infection requiring removal.  I explained it would be a six to eight week recuperation with no driving for 5 - 7 days, no lifting for six weeks. 2.  COPD. No longer smoking. Followed by Dr Edison Newman. On rx  3. Essential hypertension. Followed by Dr Thanh White  4.   Hand tremor        He wishes to proceed with a right inguinal hernia repair with mesh under MAC anesthesia as an outpatient      Caroline Sorto MD FACS

## 2018-04-20 NOTE — PATIENT INSTRUCTIONS
Surgery Instruction Sheet    You have been scheduled for surgery on 04/26/2018 at 7:30am at [facility]. Please report to the Surgery Center at 5:45am, this is approximately 2 hours prior to your surgery time. The Surgery Center is located on the [address] [place] [address] Street side of the hospital, just next to the Emergency Room. Reserved parking is available and  parking if lot is full. You will not need to have a pre-op visit before surgery, but the Pre-op Nurse will call you before surgery. The Pre-op nurse will review your medical history, medications and give you additional instructions. They will also confirm your arrival time. Call your physician immediately if you notice a change in your health between the time you saw your physician and the day of surgery. If you take a blood thinner, please let us know. Call your ordering Doctor to make sure you can stop taking it prior to your surgery. STOP YOUR ASPIRIN 10 DAYS PRIOR TO SURGERY. DO NOT TAKE  IBUPROFEN, ADVIL, MOTRIN, ALEVE, EXCEDRIN, BC POWDER, GOODIES, FISH OIL OR ANY MEDICATION CONTAINING ASPIRIN 10 DAYS PRIOR TO YOUR SURGERY. MAY TAKE TYLENOL. Eat a light dinner the evening before your surgery. DO NOT EAT OR DRINK ANYTHING AFTER MIDNIGHT THE NIGHT BEFORE YOUR SURGERY. This includes water, chewing gum, lifesavers, etc.  The Pre op nurse will check with you about any medication that you may need to take the morning of surgery. Shower with a new bar of anti-bacterial soap (Dial, Safeguard) or solution given to you by Pre-op, the night before surgery. Do not use lotion, powder, deodorant on the skin after showering.   Wear loose, comfortable clothing the day of surgery and bring a container to store your contacts, eyeglasses, dentures, hearing aid, etc.  Do not bring money, valuables, jewelry, etc. to the hospital.      If you are having outpatient surgery, someone must come with you the morning of surgery to drive you home. You can not drive for 24 hours after any anesthesia. Sometimes it is necessary to stay overnight and leave the next morning. This is still considered outpatient for most insurance deductibles. Someone will still need to drive you home. If you have questions or concerns, please feel free to call Dr Post Officer at [phone]. If you need to cancel your surgery, please call as soon as possible.

## 2018-04-20 NOTE — MR AVS SNAPSHOT
[address], [address], [address] [place] [address] [facility] 
[phone] Patient: Uriel Wasserman MRN: [id number] :1945 Visit Information Date & Time Provider Department Dept. Phone Encounter #  
 2018 10:40 AM Geoff Coleman MD [facility] [id number] Your Appointments 2018  4:20 PM  
POST OP with Geoff Coleman MD  
[facility] Dr. Juan Caban [facility] ([address]) Appt Note: post op rt inguinal hernia repair 2018  
 [address], [address] [facility], [address] [phone]  
[address] [facility], [address] [place] [address] [address] [phone] Upcoming Health Maintenance Date Due Hepatitis C Screening 1945 DTaP/Tdap/Td series (1 - Tdap) 1967 FOBT Q 1 YEAR AGE 50-75 1996 ZOSTER VACCINE AGE 60> 10/24/2006 GLAUCOMA SCREENING Q2Y 2011 Pneumococcal 65+ Low/Medium Risk (1 of 2 - PCV13) 2011 Influenza Age 5 to Adult 2017 MEDICARE YEARLY EXAM 3/14/2018 Allergies as of 2018  Review Complete On: 2018 By: Geoff Coleman MD  
  
 Severity Noted Reaction Type Reactions Codeine  2011    Other (comments) Keeps him awake Current Immunizations  Never Reviewed No immunizations on file. Not reviewed this visit You Were Diagnosed With   
  
 Codes Comments Right inguinal hernia    -  Primary ICD-10-CM: K40.90 ICD-9-CM: 550.90 Vitals BP Pulse Height(growth percentile) Weight(growth percentile) SpO2 BMI  
 148/86 (BP 1 Location: Right arm, BP Patient Position: Sitting) 90 5' 2\" (1.575 m) 132 lb (59.9 kg) 90% 24.14 kg/m2 Smoking Status Former Smoker Vitals History BMI and BSA Data Body Mass Index Body Surface Area  
 24.14 kg/m 2 1.62 m 2 Preferred Pharmacy Pharmacy Name Phone [facility] [address] [place] [address] Dr [address], [address]  [phone] Your Updated Medication List  
  
   
This list is accurate as of 4/20/18 11:10 AM.  Always use your most recent med list.  
  
  
  
  
 albuterol 90 mcg/actuation inhaler Commonly known as:  PROVENTIL HFA, VENTOLIN HFA, PROAIR HFA Take 1 Puff by inhalation every four (4) hours as needed for Wheezing. aspirin delayed-release 81 mg tablet Take 81 mg by mouth daily. fluticasone-salmeterol 500-50 mcg/dose diskus inhaler Commonly known as:  ADVAIR Take 1 Puff by inhalation every twelve (12) hours. hydroCHLOROthiazide 12.5 mg tablet Commonly known as:  HYDRODIURIL Take 12.5 mg by mouth daily. MUCINEX DM PO Take  by mouth daily. OTHER  
2 Puffs two (2) times a day. pravastatin 20 mg tablet Commonly known as:  PRAVACHOL Take 40 mg by mouth nightly. primidone 250 mg tablet Commonly known as: MYSOLINE Take 1,000 mg by mouth daily. SINGULAIR 10 mg tablet Generic drug:  montelukast  
Take 10 mg by mouth daily. tiotropium 18 mcg inhalation capsule Commonly known as:  Kathi Regulus Take 1 Cap by inhalation daily. Patient Instructions Surgery Instruction Sheet You have been scheduled for surgery on 04/26/2018 at 7:30am at [facility]. Please report to the Surgery Center at 5:45am, this is approximately 2 hours prior to your surgery time. The Surgery Center is located on the [address] [place] [address] Street side of the hospital, just next to the Emergency Room. Reserved parking is available and  parking if lot is full. You will not need to have a pre-op visit before surgery, but the Pre-op Nurse will call you before surgery. The Pre-op nurse will review your medical history, medications and give you additional instructions. They will also confirm your arrival time. Call your physician immediately if you notice a change in your health between the time you saw your physician and the day of surgery. If you take a blood thinner, please let us know. Call your ordering Doctor to make sure you can stop taking it prior to your surgery. STOP YOUR ASPIRIN 10 DAYS PRIOR TO SURGERY. DO NOT TAKE  IBUPROFEN, ADVIL, MOTRIN, ALEVE, EXCEDRIN, BC POWDER, GOODIES, FISH OIL OR ANY MEDICATION CONTAINING ASPIRIN 10 DAYS PRIOR TO YOUR SURGERY. MAY TAKE TYLENOL. Eat a light dinner the evening before your surgery. DO NOT EAT OR DRINK ANYTHING AFTER MIDNIGHT THE NIGHT BEFORE YOUR SURGERY. This includes water, chewing gum, lifesavers, etc.  The Pre op nurse will check with you about any medication that you may need to take the morning of surgery. Shower with a new bar of anti-bacterial soap (Dial, Safeguard) or solution given to you by Pre-op, the night before surgery. Do not use lotion, powder, deodorant on the skin after showering. Wear loose, comfortable clothing the day of surgery and bring a container to store your contacts, eyeglasses, dentures, hearing aid, etc.  Do not bring money, valuables, jewelry, etc. to the hospital.   
 
If you are having outpatient surgery, someone must come with you the morning of surgery to drive you home. You can not drive for 24 hours after any anesthesia. Sometimes it is necessary to stay overnight and leave the next morning. This is still considered outpatient for most insurance deductibles. Someone will still need to drive you home. If you have questions or concerns, please feel free to call Dr Pamela Briseno at [phone]. If you need to cancel your surgery, please call as soon as possible. Introducing [facility] & HEALTH SERVICES! [facility] introduces [employer] patient portal. Now you can access parts of your medical record, email your doctor's office, and request medication refills online.    
 
1. In your internet browser, go to [email or web address] 2. Click on the First Time User? Click Here link in the Sign In box. You will see the New Member Sign Up page. 3. Enter your [employer] Access Code exactly as it appears below. You will not need to use this code after youve completed the sign-up process. If you do not sign up before the expiration date, you must request a new code. · [employer] Access Code: [email or web address] Expires: 4/29/2018 11:54 AM 
 
4. Enter the last four digits of your Social Security Number (xxxx) and Date of Birth (mm/dd/yyyy) as indicated and click Submit. You will be taken to the next sign-up page. 5. Create a [employer] ID. This will be your [employer] login ID and cannot be changed, so think of one that is secure and easy to remember. 6. Create a [employer] password. You can change your password at any time. 7. Enter your Password Reset Question and Answer. This can be used at a later time if you forget your password. 8. Enter your e-mail address. You will receive e-mail notification when new information is available in [address] 9. Click Sign Up. You can now view and download portions of your medical record. 10. Click the Download Summary menu link to download a portable copy of your medical information. If you have questions, please visit the Frequently Asked Questions section of the [employer] website. Remember, [employer] is NOT to be used for urgent needs. For medical emergencies, dial 911. Now available from your iPhone and Android! Please provide this summary of care documentation to your next provider. Your primary care clinician is listed as Bharat Goins. If you have any questions after today's visit, please call [phone].

## 2018-04-21 LAB
APPEARANCE UR: CLEAR
BILIRUB UR QL STRIP: NEGATIVE
COLOR UR: YELLOW
GLUCOSE UR QL: NEGATIVE
HGB UR QL STRIP: NEGATIVE
KETONES UR QL STRIP: NEGATIVE
LEUKOCYTE ESTERASE UR QL STRIP: NEGATIVE
MICRO URNS: NORMAL
NITRITE UR QL STRIP: NEGATIVE
PH UR STRIP: 7 [PH] (ref 5–7.5)
PROT UR QL STRIP: NEGATIVE
SP GR UR: 1.01 (ref 1–1.03)
UROBILINOGEN UR STRIP-MCNC: 0.2 MG/DL (ref 0.2–1)

## 2018-04-23 NOTE — ADVANCED PRACTICE NURSE
Reviewed planned procedure, pulmonary notes, cardiac cath, cardiac notes and echo with Dr. Brett Tobin. OK to proceed with surgery.

## 2018-04-23 NOTE — PERIOP NOTES
[facility]  Preoperative Instructions        Surgery Date 04/26/18          Time of Arrival 0545    1. On the day of your surgery, please report to the Surgical Services Registration Desk and sign in at your designated time. The Surgery Center is located to the right of the Emergency Room. 2. You must have someone with you to drive you home. You should not drive a car for 24 hours following surgery. Please make arrangements for a friend or family member to stay with you for the first 24 hours after your surgery. 3. Do not have anything to eat or drink (including water, gum, mints, coffee, juice) after midnight 04/25/18?? Aron Ross ? This may not apply to medications prescribed by your physician. ?(Please note below the special instructions with medications to take the morning of your procedure.)    4. We recommend you do not drink any alcoholic beverages for 24 hours before and after your surgery. 5. Contact your surgeons office for instructions on the following medications: non-steroidal anti-inflammatory drugs (i.e. Advil, Aleve), vitamins, and supplements. (Some surgeons will want you to stop these medications prior to surgery and others may allow you to take them)  **If you are currently taking Plavix, Coumadin, Aspirin and/or other blood-thinning agents, contact your surgeon for instructions. ** Your surgeon will partner with the physician prescribing these medications to determine if it is safe to stop or if you need to continue taking. Please do not stop taking these medications without instructions from your surgeon    6. Wear comfortable clothes. Wear glasses instead of contacts. Do not bring any money or jewelry. Please bring picture ID, insurance card, and any prearranged co-payment or hospital payment. Do not wear make-up, particularly mascara the morning of your surgery. Do not wear nail polish, particularly if you are having foot /hand surgery.   Wear your hair loose or down, no ponytails, buns, mike pins or clips. All body piercings must be removed. Please shower with antibacterial soap for three consecutive days before and on the morning of surgery, but do not apply any lotions, powders or deodorants after the shower on the day of surgery. Please use a fresh towels after each shower. Please sleep in clean clothes and change bed linens the night before surgery. Please do not shave for 48 hours prior to surgery. Shaving of the face is acceptable. 7. You should understand that if you do not follow these instructions your surgery may be cancelled. If your physical condition changes (I.e. fever, cold or flu) please contact your surgeon as soon as possible. 8. It is important that you be on time. If a situation occurs where you may be late, please call [phone] (OR Holding Area). 9. If you have any questions and or problems, please call [phone] (Pre-admission Testing). 10. Your surgery time may be subject to change. You will receive a phone call the evening prior if your time changes. 11.  If having outpatient surgery, you must have someone to drive you here, stay with you during the duration of your stay, and to drive you home at time of discharge. 12.   In an effort to improve the efficiency, privacy, and safety for all of our Pre-op patients visitors are not allowed in the Holding area. Once you arrive and are registered your family/visitors will be asked to remain in the waiting room. The Pre-op staff will get you from the Surgical Waiting Area and will explain to you and your family/visitors that the Pre-op phase is beginning. The staff will answer any questions and provide instructions for tracking of the patient, by use of the existing tracking number and color-coded status board in the waiting room.   At this time the staff will also ask for your designated spokesperson information in the event that the physician or staff need to provide an update or obtain any pertinent information. The designated spokesperson will be notified if the physician needs to speak to family during the pre-operative phase. If at any time your family/visitors has questions or concerns they may approach the volunteer desk in the waiting area for assistance. Special Instructions: bring o2 and albuterol inhaler with you to the hospital.    [address] WITH A SIP OF WATER: advair, spiriva, and albuterol inhaler if needed      I understand a pre-operative phone call will be made to verify my surgery time. In the event that I am not available, I give permission for a message to be left on my answering service and/or with another person?   Yes  [phone]         ___________________      __________   _________    (Signature of Patient)             (Witness)                (Date and Time)

## 2018-04-26 ENCOUNTER — HOSPITAL ENCOUNTER (OUTPATIENT)
Age: 73
Setting detail: OUTPATIENT SURGERY
Discharge: HOME | End: 2018-04-26
Attending: SURGERY | Admitting: SURGERY
Payer: MEDICARE

## 2018-04-26 ENCOUNTER — ANESTHESIA EVENT (OUTPATIENT)
Dept: SURGERY | Age: 73
End: 2018-04-26
Payer: MEDICARE

## 2018-04-26 ENCOUNTER — ANESTHESIA (OUTPATIENT)
Dept: SURGERY | Age: 73
End: 2018-04-26
Payer: MEDICARE

## 2018-04-26 VITALS
BODY MASS INDEX: 23.49 KG/M2 | RESPIRATION RATE: 14 BRPM | WEIGHT: 127.65 LBS | HEART RATE: 85 BPM | DIASTOLIC BLOOD PRESSURE: 64 MMHG | OXYGEN SATURATION: 89 % | HEIGHT: 62 IN | SYSTOLIC BLOOD PRESSURE: 138 MMHG | TEMPERATURE: 97.5 F

## 2018-04-26 DIAGNOSIS — K40.90 RIGHT INGUINAL HERNIA: Primary | ICD-10-CM

## 2018-04-26 LAB
APPEARANCE UR: CLEAR
BACTERIA URNS QL MICRO: NEGATIVE /HPF
BILIRUB UR QL: NEGATIVE
COLOR UR: NORMAL
EPITH CASTS URNS QL MICRO: NORMAL /LPF
GLUCOSE UR STRIP.AUTO-MCNC: NEGATIVE MG/DL
HGB UR QL STRIP: NEGATIVE
HYALINE CASTS URNS QL MICRO: NORMAL /LPF (ref 0–5)
KETONES UR QL STRIP.AUTO: NEGATIVE MG/DL
LEUKOCYTE ESTERASE UR QL STRIP.AUTO: NEGATIVE
NITRITE UR QL STRIP.AUTO: NEGATIVE
PH UR STRIP: 7.5 [PH] (ref 5–8)
PROT UR STRIP-MCNC: NEGATIVE MG/DL
RBC #/AREA URNS HPF: NORMAL /HPF (ref 0–5)
SP GR UR REFRACTOMETRY: 1.01 (ref 1–1.03)
UA: UC IF INDICATED,UAUC: NORMAL
UROBILINOGEN UR QL STRIP.AUTO: 0.2 EU/DL (ref 0.2–1)
WBC URNS QL MICRO: NORMAL /HPF (ref 0–4)

## 2018-04-26 PROCEDURE — 74011250636 HC RX REV CODE- 250/636

## 2018-04-26 PROCEDURE — 77030002946 HC SUT NRLN J&J -B: Performed by: SURGERY

## 2018-04-26 PROCEDURE — 77030039266 HC ADH SKN EXOFIN S2SG -A: Performed by: SURGERY

## 2018-04-26 PROCEDURE — 77030012406 HC DRN WND PENRS BARD -A: Performed by: SURGERY

## 2018-04-26 PROCEDURE — 77030011640 HC PAD GRND REM COVD -A: Performed by: SURGERY

## 2018-04-26 PROCEDURE — 74011250636 HC RX REV CODE- 250/636: Performed by: ANESTHESIOLOGY

## 2018-04-26 PROCEDURE — 88302 TISSUE EXAM BY PATHOLOGIST: CPT | Performed by: SURGERY

## 2018-04-26 PROCEDURE — 76210000063 HC OR PH I REC FIRST 0.5 HR: Performed by: SURGERY

## 2018-04-26 PROCEDURE — 74011000250 HC RX REV CODE- 250: Performed by: SURGERY

## 2018-04-26 PROCEDURE — 74011250636 HC RX REV CODE- 250/636: Performed by: SURGERY

## 2018-04-26 PROCEDURE — 81001 URINALYSIS AUTO W/SCOPE: CPT | Performed by: SURGERY

## 2018-04-26 PROCEDURE — 77030031139 HC SUT VCRL2 J&J -A: Performed by: SURGERY

## 2018-04-26 PROCEDURE — 77030020782 HC GWN BAIR PAWS FLX 3M -B

## 2018-04-26 PROCEDURE — 76010000138 HC OR TIME 0.5 TO 1 HR: Performed by: SURGERY

## 2018-04-26 PROCEDURE — C1781 MESH (IMPLANTABLE): HCPCS | Performed by: SURGERY

## 2018-04-26 PROCEDURE — 76060000032 HC ANESTHESIA 0.5 TO 1 HR: Performed by: SURGERY

## 2018-04-26 PROCEDURE — 76210000022 HC REC RM PH II 1.5 TO 2 HR: Performed by: SURGERY

## 2018-04-26 PROCEDURE — 77030002986 HC SUT PROL J&J -A: Performed by: SURGERY

## 2018-04-26 PROCEDURE — 77030002996 HC SUT SLK J&J -A: Performed by: SURGERY

## 2018-04-26 PROCEDURE — 74011000250 HC RX REV CODE- 250

## 2018-04-26 PROCEDURE — 77030032490 HC SLV COMPR SCD KNE COVD -B: Performed by: SURGERY

## 2018-04-26 DEVICE — BARD SOFT MESH, 3" X 6" (7.5 CM X 15 CM)
Type: IMPLANTABLE DEVICE | Site: GROIN | Status: FUNCTIONAL
Brand: BARD

## 2018-04-26 RX ORDER — PROPOFOL 10 MG/ML
INJECTION, EMULSION INTRAVENOUS
Status: DISCONTINUED | OUTPATIENT
Start: 2018-04-26 | End: 2018-04-26 | Stop reason: HOSPADM

## 2018-04-26 RX ORDER — FENTANYL CITRATE 50 UG/ML
25 INJECTION, SOLUTION INTRAMUSCULAR; INTRAVENOUS
Status: CANCELLED | OUTPATIENT
Start: 2018-04-26

## 2018-04-26 RX ORDER — SODIUM CHLORIDE 0.9 % (FLUSH) 0.9 %
5-10 SYRINGE (ML) INJECTION EVERY 8 HOURS
Status: DISCONTINUED | OUTPATIENT
Start: 2018-04-26 | End: 2018-04-26 | Stop reason: HOSPADM

## 2018-04-26 RX ORDER — SODIUM CHLORIDE 0.9 % (FLUSH) 0.9 %
5-10 SYRINGE (ML) INJECTION AS NEEDED
Status: CANCELLED | OUTPATIENT
Start: 2018-04-26

## 2018-04-26 RX ORDER — SODIUM CHLORIDE 0.9 % (FLUSH) 0.9 %
5-10 SYRINGE (ML) INJECTION AS NEEDED
Status: DISCONTINUED | OUTPATIENT
Start: 2018-04-26 | End: 2018-04-26 | Stop reason: HOSPADM

## 2018-04-26 RX ORDER — SODIUM CHLORIDE, SODIUM LACTATE, POTASSIUM CHLORIDE, CALCIUM CHLORIDE 600; 310; 30; 20 MG/100ML; MG/100ML; MG/100ML; MG/100ML
25 INJECTION, SOLUTION INTRAVENOUS CONTINUOUS
Status: DISCONTINUED | OUTPATIENT
Start: 2018-04-26 | End: 2018-04-26 | Stop reason: HOSPADM

## 2018-04-26 RX ORDER — BUPIVACAINE HYDROCHLORIDE AND EPINEPHRINE 5; 5 MG/ML; UG/ML
INJECTION, SOLUTION EPIDURAL; INTRACAUDAL; PERINEURAL AS NEEDED
Status: DISCONTINUED | OUTPATIENT
Start: 2018-04-26 | End: 2018-04-26 | Stop reason: HOSPADM

## 2018-04-26 RX ORDER — SODIUM CHLORIDE, SODIUM LACTATE, POTASSIUM CHLORIDE, CALCIUM CHLORIDE 600; 310; 30; 20 MG/100ML; MG/100ML; MG/100ML; MG/100ML
25 INJECTION, SOLUTION INTRAVENOUS CONTINUOUS
Status: CANCELLED | OUTPATIENT
Start: 2018-04-26

## 2018-04-26 RX ORDER — AMOXICILLIN 250 MG
1 CAPSULE ORAL DAILY
Qty: 30 TAB | Refills: 0 | Status: SHIPPED | OUTPATIENT
Start: 2018-04-26 | End: 2018-05-26

## 2018-04-26 RX ORDER — CEFAZOLIN SODIUM 1 G/3ML
2 INJECTION, POWDER, FOR SOLUTION INTRAMUSCULAR; INTRAVENOUS ONCE
Status: DISCONTINUED | OUTPATIENT
Start: 2018-04-26 | End: 2018-04-26

## 2018-04-26 RX ORDER — LIDOCAINE HYDROCHLORIDE 10 MG/ML
0.1 INJECTION, SOLUTION EPIDURAL; INFILTRATION; INTRACAUDAL; PERINEURAL AS NEEDED
Status: DISCONTINUED | OUTPATIENT
Start: 2018-04-26 | End: 2018-04-26 | Stop reason: HOSPADM

## 2018-04-26 RX ORDER — FENTANYL CITRATE 50 UG/ML
INJECTION, SOLUTION INTRAMUSCULAR; INTRAVENOUS AS NEEDED
Status: DISCONTINUED | OUTPATIENT
Start: 2018-04-26 | End: 2018-04-26 | Stop reason: HOSPADM

## 2018-04-26 RX ORDER — CEFAZOLIN SODIUM/WATER 2 G/20 ML
2 SYRINGE (ML) INTRAVENOUS
Status: COMPLETED | OUTPATIENT
Start: 2018-04-26 | End: 2018-04-26

## 2018-04-26 RX ORDER — LIDOCAINE HYDROCHLORIDE 20 MG/ML
INJECTION, SOLUTION EPIDURAL; INFILTRATION; INTRACAUDAL; PERINEURAL AS NEEDED
Status: DISCONTINUED | OUTPATIENT
Start: 2018-04-26 | End: 2018-04-26 | Stop reason: HOSPADM

## 2018-04-26 RX ORDER — MIDAZOLAM HYDROCHLORIDE 1 MG/ML
INJECTION, SOLUTION INTRAMUSCULAR; INTRAVENOUS AS NEEDED
Status: DISCONTINUED | OUTPATIENT
Start: 2018-04-26 | End: 2018-04-26 | Stop reason: HOSPADM

## 2018-04-26 RX ORDER — DEXAMETHASONE SODIUM PHOSPHATE 4 MG/ML
INJECTION, SOLUTION INTRA-ARTICULAR; INTRALESIONAL; INTRAMUSCULAR; INTRAVENOUS; SOFT TISSUE AS NEEDED
Status: DISCONTINUED | OUTPATIENT
Start: 2018-04-26 | End: 2018-04-26 | Stop reason: HOSPADM

## 2018-04-26 RX ORDER — OXYCODONE AND ACETAMINOPHEN 5; 325 MG/1; MG/1
1-2 TABLET ORAL
Qty: 30 TAB | Refills: 0 | Status: SHIPPED | OUTPATIENT
Start: 2018-04-26 | End: 2018-05-16

## 2018-04-26 RX ORDER — MORPHINE SULFATE 10 MG/ML
2 INJECTION, SOLUTION INTRAMUSCULAR; INTRAVENOUS
Status: CANCELLED | OUTPATIENT
Start: 2018-04-26

## 2018-04-26 RX ORDER — ONDANSETRON 2 MG/ML
4 INJECTION INTRAMUSCULAR; INTRAVENOUS AS NEEDED
Status: CANCELLED | OUTPATIENT
Start: 2018-04-26

## 2018-04-26 RX ORDER — LIDOCAINE HYDROCHLORIDE AND EPINEPHRINE 10; 10 MG/ML; UG/ML
INJECTION, SOLUTION INFILTRATION; PERINEURAL AS NEEDED
Status: DISCONTINUED | OUTPATIENT
Start: 2018-04-26 | End: 2018-04-26 | Stop reason: HOSPADM

## 2018-04-26 RX ORDER — IBUPROFEN 600 MG/1
600 TABLET ORAL
Qty: 30 TAB | Refills: 0 | Status: SHIPPED | OUTPATIENT
Start: 2018-04-26 | End: 2018-05-26

## 2018-04-26 RX ORDER — ONDANSETRON 2 MG/ML
INJECTION INTRAMUSCULAR; INTRAVENOUS AS NEEDED
Status: DISCONTINUED | OUTPATIENT
Start: 2018-04-26 | End: 2018-04-26 | Stop reason: HOSPADM

## 2018-04-26 RX ORDER — DIPHENHYDRAMINE HYDROCHLORIDE 50 MG/ML
12.5 INJECTION, SOLUTION INTRAMUSCULAR; INTRAVENOUS
Status: CANCELLED | OUTPATIENT
Start: 2018-04-26

## 2018-04-26 RX ADMIN — FENTANYL CITRATE 25 MCG: 50 INJECTION, SOLUTION INTRAMUSCULAR; INTRAVENOUS at 07:51

## 2018-04-26 RX ADMIN — PROPOFOL 10 MCG/KG/MIN: 10 INJECTION, EMULSION INTRAVENOUS at 07:32

## 2018-04-26 RX ADMIN — SODIUM CHLORIDE, POTASSIUM CHLORIDE, SODIUM LACTATE AND CALCIUM CHLORIDE: 600; 310; 30; 20 INJECTION, SOLUTION INTRAVENOUS at 07:19

## 2018-04-26 RX ADMIN — DEXAMETHASONE SODIUM PHOSPHATE 8 MG: 4 INJECTION, SOLUTION INTRA-ARTICULAR; INTRALESIONAL; INTRAMUSCULAR; INTRAVENOUS; SOFT TISSUE at 07:34

## 2018-04-26 RX ADMIN — FENTANYL CITRATE 25 MCG: 50 INJECTION, SOLUTION INTRAMUSCULAR; INTRAVENOUS at 07:47

## 2018-04-26 RX ADMIN — LIDOCAINE HYDROCHLORIDE 40 MG: 20 INJECTION, SOLUTION EPIDURAL; INFILTRATION; INTRACAUDAL; PERINEURAL at 07:32

## 2018-04-26 RX ADMIN — MIDAZOLAM HYDROCHLORIDE 1 MG: 1 INJECTION, SOLUTION INTRAMUSCULAR; INTRAVENOUS at 07:27

## 2018-04-26 RX ADMIN — ONDANSETRON 4 MG: 2 INJECTION INTRAMUSCULAR; INTRAVENOUS at 07:58

## 2018-04-26 RX ADMIN — Medication 2 G: at 07:31

## 2018-04-26 NOTE — ANESTHESIA PREPROCEDURE EVALUATION
Anesthetic History   No history of anesthetic complications            Review of Systems / Medical History  Patient summary reviewed, nursing notes reviewed and pertinent labs reviewed    Pulmonary    COPD: severe      Shortness of breath and smoker (Former smoker, quit in 2005)      Comments: Hx of chronic resp failure with hypoxia, home oxygen 2 L, does not use unless needed   Neuro/Psych   Within defined limits           Cardiovascular    Hypertension: well controlled          CAD and hyperlipidemia    Exercise tolerance: <4 METS  Comments: Pulmonary hypertension      echo 50-55% EF    GI/Hepatic/Renal         Renal disease: stones      Comments: Occult blood Endo/Other  Within defined limits           Other Findings              Physical Exam    Airway  Mallampati: I  TM Distance: 4 - 6 cm  Neck ROM: normal range of motion   Mouth opening: Normal     Cardiovascular    Rhythm: regular  Rate: normal         Dental    Dentition: Full upper dentures and Full lower dentures  Comments: Lower plate snaps in place   Pulmonary      Decreased breath sounds: bilateral           Abdominal  GI exam deferred       Other Findings            Anesthetic Plan    ASA: 3  Anesthesia type: total IV anesthesia and MAC    Monitoring Plan: BIS      Induction: Intravenous  Anesthetic plan and risks discussed with: Patient

## 2018-04-26 NOTE — BRIEF OP NOTE
BRIEF OPERATIVE NOTE    Date of Procedure: 4/26/2018   Preoperative Diagnosis: RIGHT INGUINAL HERNIA  Postoperative Diagnosis: RIGHT INGUINAL HERNIA    Procedure(s):  RIGHT INGUINAL HERNIA REPAIR WITH MESH  Surgeon(s) and Role:     * Anyi Schultz MD - Primary         Surgical Assistant: Mary Cabral    Surgical Staff:  Circ-1: Rosa Paz  Scrub Tech-1: Kam Billy  Surg Asst-1: Hulon Gitelman  Event Time In   Incision Start 2241   Incision Close 0818     Anesthesia: MAC   Estimated Blood Loss: 10 ml  Specimens:   ID Type Source Tests Collected by Time Destination   1 : right inguinal hernia sac Preservative Tissue  Anyi Schultz MD 4/26/2018 8507 Pathology      Findings: direct and indirect defects   Complications: none  Implants:   Implant Name Type Inv.  Item Serial No.  Lot No. LRB No. Used Action   MESH FAVIOLA FLAT SHT 7.5X15CM --  - SN/A   MESH FAVIOLA FLAT SHT 7.5X15CM --  N/A [employer] FERNY [id number] 1 Implanted

## 2018-04-26 NOTE — PERIOP NOTES
TRANSFER - OUT REPORT:    Verbal report given to Vishal Wood RN(name) on Sherri Blas  being transferred to Phase II(unit) for routine progression of care       Report consisted of patients Situation, Background, Assessment and   Recommendations(SBAR). Information from the following report(s) SBAR, OR Summary, Procedure Summary, Intake/Output and MAR was reviewed with the receiving nurse. Opportunity for questions and clarification was provided.       Patient transported with:   Registered Nurse

## 2018-04-26 NOTE — ANESTHESIA POSTPROCEDURE EVALUATION
Post-Anesthesia Evaluation and Assessment    Patient: Abilio Garza MRN: [id number]  SSN: [id number]    YOB: 1945  Age: 71 y.o. Sex: male       Cardiovascular Function/Vital Signs  Visit Vitals    /79 (BP 1 Location: Right arm, BP Patient Position: At rest)    Pulse 89    Temp 36.4 °C (97.5 °F)    Resp 12    Ht 5' 2\" (1.575 m)    Wt 57.9 kg (127 lb 10.3 oz)    SpO2 91%    BMI 23.35 kg/m2       Patient is status post total IV anesthesia, MAC anesthesia for Procedure(s):  RIGHT INGUINAL HERNIA REPAIR WITH MESH. Nausea/Vomiting: None    Postoperative hydration reviewed and adequate. Pain:  Pain Scale 1: Numeric (0 - 10) (04/26/18 0850)  Pain Intensity 1: 0 (04/26/18 0850)   Managed    Neurological Status:   Neuro (WDL): Within Defined Limits (04/26/18 0850)  Neuro  LUE Motor Response: Purposeful (04/26/18 0850)  LLE Motor Response: Purposeful (04/26/18 0850)  RUE Motor Response: Purposeful (04/26/18 0850)  RLE Motor Response: Purposeful (04/26/18 0850)   At baseline    Mental Status and Level of Consciousness: Arousable    Pulmonary Status:   O2 Device: Room air (04/26/18 0850)   Adequate oxygenation and airway patent    Complications related to anesthesia: None    Post-anesthesia assessment completed.  No concerns    Signed By: Dolores Cartwright MD     April 26, 2018

## 2018-04-26 NOTE — IP AVS SNAPSHOT
[address] [facility] 
[phone] Patient: Rama Rivas MRN: [id number] :1945 About your hospitalization You were admitted on:  2018 You last received care in the:  [facility] You were discharged on:  2018 Why you were hospitalized Your primary diagnosis was:  Not on File Follow-up Information Follow up With Details Comments Contact Info Concetta Ramirez MD   [address] [facility] 
[phone] Your Scheduled Appointments Wednesday May 16, 2018  4:20 PM EDT  
POST OP with John Harper MD  
[facility] ([address] [facility]) [address] [place] [address], [address] [facility]  
[phone] Discharge Orders None A check arslan indicates which time of day the medication should be taken. My Medications START taking these medications Instructions Each Dose to Equal  
 Morning Noon Evening Bedtime  
 ibuprofen 600 mg tablet Commonly known as:  MOTRIN Your last dose was: Your next dose is: Take 1 Tab by mouth every eight (8) hours as needed for Pain for up to 30 days. 600 mg  
    
   
   
   
  
 oxyCODONE-acetaminophen 5-325 mg per tablet Commonly known as:  PERCOCET Your last dose was: Your next dose is: Take 1-2 Tabs by mouth every six (6) hours as needed for Pain for up to 20 days. Max Daily Amount: 8 Tabs. 1-2 Tab  
    
   
   
   
  
 senna-docusate 8.6-50 mg per tablet Commonly known as:  SENNA-S Your last dose was: Your next dose is: Take 1 Tab by mouth daily for 30 days. 1 Tab CONTINUE taking these medications Instructions Each Dose to Equal  
 Morning Noon Evening Bedtime  
 albuterol 90 mcg/actuation inhaler Commonly known as:  PROVENTIL HFA, VENTOLIN HFA, PROAIR HFA Your last dose was: Your next dose is: Take 1 Puff by inhalation every four (4) hours as needed for Wheezing. 1 Puff  
    
   
   
   
  
 fluticasone-salmeterol 500-50 mcg/dose diskus inhaler Commonly known as:  ADVAIR Your last dose was: Your next dose is: Take 1 Puff by inhalation every twelve (12) hours. 1 Puff  
    
   
   
   
  
 hydroCHLOROthiazide 12.5 mg tablet Commonly known as:  HYDRODIURIL Your last dose was: Your next dose is: Take 12.5 mg by mouth daily. 12.5 mg  
    
   
   
   
  
 MUCINEX DM PO Your last dose was: Your next dose is: Take  by mouth two (2) times a day. pravastatin 20 mg tablet Commonly known as:  PRAVACHOL Your last dose was: Your next dose is: Take 40 mg by mouth nightly. 40 mg  
    
   
   
   
  
 primidone 250 mg tablet Commonly known as: MYSOLINE Your last dose was: Your next dose is: Take 1,000 mg by mouth daily. 1000 mg SINGULAIR 10 mg tablet Generic drug:  montelukast  
   
Your last dose was: Your next dose is: Take 10 mg by mouth daily. 10 mg  
    
   
   
   
  
 tiotropium 18 mcg inhalation capsule Commonly known as:  Pietro [facility] Your last dose was: Your next dose is: Take 1 Cap by inhalation daily. 1 Cap Where to Get Your Medications These medications were sent to [address] [place], [address] [facility] [address] [place] [address]  [phone] [place] [address], [address] [place] [address] Phone:  [phone]  
  ibuprofen 600 mg tablet  
 senna-docusate 8.6-50 mg per tablet Information on where to get these meds will be given to you by the nurse or doctor. ! Ask your nurse or doctor about these medications  
  oxyCODONE-acetaminophen 5-325 mg per tablet Opioid Education Prescription Opioids: What You Need to Know: 
 
 
Walk regularly. No lifting more than 5 to 10 pounds for 6 weeks. Light aerobic activity is okay when you feel up to it. You may resume driving in five days unless still requiring narcotics for pain. Work: 
 
You may return to work in 2 or 3 weeks to light activity. No lifting more than 5 pounds for four weeks and no more than 10 pounds for an additional 2 weeks. Diet: 
 
You may resume normal diet after 24 hours. Anesthesia and narcotics may cause nausea and vomiting. If persistent please call the office. Wound Care: You have a special dressing called Dermabond. It is okay to shower and let the water run over the incisions but do not scrub the area or soak in a tub. If you have a small amount of drainage you may place a dry bandage over the wound and change it daily. If you experience a lot of drainage, develop redness around the wound, or a fever over 101 F occurs please call the office. May use ice over incision for comfort. Medications: 
 
Resume home medications as indicated on the Medical Reconciliation form. Aspirin and Coumadin can be restarted immediately if you were taking them preoperatively. If taking Plavix do not restart it until post operative day 2. Pain medications:  Non steroidal antiinflammatories seem to work best for post surgical pain. Try these first as prescribed. A narcotic prescription will also be given for breakthrough pain. Over the counter stool softeners and laxatives may be used if needed. Do not hesitate to call with questions or concerns. DISCHARGE SUMMARY from Nurse PATIENT INSTRUCTIONS: 
 
After general anesthesia or intravenous sedation, for 24 hours or while taking prescription Narcotics: · Limit your activities · Do not drive and operate hazardous machinery · Do not make important personal or business decisions · Do  not drink alcoholic beverages · If you have not urinated within 8 hours after discharge, please contact your surgeon on call. Report the following to your surgeon: 
· Excessive pain, swelling, redness or odor of or around the surgical area · Temperature over 100.5 · Nausea and vomiting lasting longer than 4 hours or if unable to take medications · Any signs of decreased circulation or nerve impairment to extremity: change in color, persistent  numbness, tingling, coldness or increase pain · Any questions What to do at Home: *  Please give a list of your current medications to your Primary Care Provider. *  Please update this list whenever your medications are discontinued, doses are 
    changed, or new medications (including over-the-counter products) are added. *  Please carry medication information at all times in case of emergency situations. These are general instructions for a healthy lifestyle: No smoking/ No tobacco products/ Avoid exposure to second hand smoke Surgeon General's Warning:  Quitting smoking now greatly reduces serious risk to your health. Obesity, smoking, and sedentary lifestyle greatly increases your risk for illness A healthy diet, regular physical exercise & weight monitoring are important for maintaining a healthy lifestyle You may be retaining fluid if you have a history of heart failure or if you experience any of the following symptoms:  Weight gain of 3 pounds or more overnight or 5 pounds in a week, increased swelling in our hands or feet or shortness of breath while lying flat in bed. Please call your doctor as soon as you notice any of these symptoms; do not wait until your next office visit. Recognize signs and symptoms of STROKE: 
 
F-face looks uneven A-arms unable to move or move unevenly S-speech slurred or non-existent T-time-call 911 as soon as signs and symptoms begin-DO NOT go Back to bed or wait to see if you get better-TIME IS BRAIN. Warning Signs of HEART ATTACK Call 911 if you have these symptoms: 
? Chest discomfort. Most heart attacks involve discomfort in the center of the chest that lasts more than a few minutes, or that goes away and comes back. It can feel like uncomfortable pressure, squeezing, fullness, or pain. ? Discomfort in other areas of the upper body. Symptoms can include pain or discomfort in one or both arms, the back, neck, jaw, or stomach. ? Shortness of breath with or without chest discomfort. ? Other signs may include breaking out in a cold sweat, nausea, or lightheadedness. Don't wait more than five minutes to call [address]! Fast action can save your life. Calling 911 is almost always the fastest way to get lifesaving treatment. Emergency Medical Services staff can begin treatment when they arrive  up to an hour sooner than if someone gets to the hospital by car. The discharge information has been reviewed with the patient and caregiver. The patient and caregiver verbalized understanding. Discharge medications reviewed with the patient and caregiver and appropriate educational materials and side effects teaching were provided. ___________________________________________________________________________________________________________________________________ How to Care for Your Wound After Its Treated With DERMABOND* Topical Skin Adhesive DERMABOND* Topical Skin Adhesive (2-octyl cyanoacrylate) is a sterile, liquid skin adhesive 
that holds wound edges together. The film will usually remain in place for 5 to 10 days, then 
naturally fall off your skin. The following will answer some of your questions and provide instructions for proper care for your 
wound while it is healing: CHECK WOUND APPEARANCE 
 Some swelling, redness, and pain are common with all wounds and normally will go away as the 
wound heals. If swelling, redness, or pain increases or if the wound feels warm to the touch, 
contact a doctor. Also contact a doctor if the wound edges reopen or separate. REPLACE BANDAGES 
 If your wound is bandaged, keep the bandage dry.  Replace the dressing daily until the adhesive film has fallen off or if the 
bandage should become wet, unless otherwise instructed by your 
physician.  When changing the dressing, do not place tape directly over the DERMABOND adhesive film, because removing the tape later may also 
remove the film. AVOID TOPICAL MEDICATIONS  Do not apply liquid or ointment medications or any other product to your wound while the DERMABOND adhesive film is in place. These may loosen the film before your wound is healed. KEEP WOUND DRY AND PROTECTED  You may occasionally and briefly wet your wound in the shower or bath. Do not soak or scrub 
your wound, do not swim, and avoid periods of heavy perspiration until the DERMABOND 
adhesive has naturally fallen off. After showering or bathing, gently blot your wound dry with a 
soft towel. If a protective dressing is being used, apply a fresh, dry bandage, being sure to keep 
the tape off the DERMABOND adhesive film.  Apply a clean, dry bandage over the wound if necessary to protect it.  Protect your wound from injury until the skin has had sufficient time to heal. 
 Do not scratch, rub, or pick at the DERMABOND adhesive film. This may loosen the film before your wound is healed.  Protect the wound from prolonged exposure to sunlight or tanning lamps while the film is in 
place. If you have any questions or concerns about this product, please consult your doctor. *Trademark ©[employer] 2002 A common side effect of anesthesia following surgery is nausea and/or vomiting. In order to decrease symptoms, it is wise to avoid foods that are high in fat, greasy foods, milk products, and spicy foods for the first 24 hours. Acceptable foods for the first 24 hours following surgery include but are not limited to: 
 
? soup 
? broth 
?  toast  
? crackers ? applesauce 
? bananas  
? mashed potatoes, 
? soft or scrambled eggs 
? oatmeal 
?  jello It is important to eat when taking your pain medication. This will help to prevent nausea. If possible, please try to time your meals with your medications. It is very important to stay hydrated following surgery. Sip fluids frequently while awake. Avoid acidic drinks such as citrus juices and soda for 24 hours. Carbonated beverages may cause bloating and gas. Acceptable fluids include: 
 
? water (flavor packets may add variety) ? coffee or tea (in moderation) ? Gatorade ? Maria Thomas ? apple juice 
? cranberry juice You are encouraged to cough and deep breathe every hour when awake. This will help to prevent respiratory complications following anesthesia. You may want to hug a pillow when coughing and sneezing to add additional support to the surgical area and to decrease discomfort if you had abdominal or chest surgery. If you are discharged home with support stockings, you may remove them after 24 hours. Support stockings are used to help prevent blood clots in the legs following surgery. Please take time to review all of your Home Care Instructions and Medication Information sheets provided in your discharge packet. If you have any questions, please contact your surgeons office. Thank you. Narcotic-Analgesic/Acetaminophen (Percocet, Norco, Lorcet HD, Lortab 10/325) - (By mouth) Why this medicine is used:  
Relieves pain. Contact a nurse or doctor right away if you have: 
· Extreme weakness, shallow breathing, slow heartbeat · Severe confusion, lightheadedness, dizziness, fainting · Yellow skin or eyes, dark urine or pale stools · Severe constipation, severe stomach pain, nausea, vomiting, loss of appetite · Sweating or cold, clammy skin Common side effects: · Mild constipation, nausea, vomiting · Sleepiness, tiredness · Itching, rash © 2017 [employer] Information is for End User's use only and may not be sold, redistributed or otherwise used for commercial purposes. Ibuprofen (Advil, Advil Children's, Motrin, Children's Ibuprofen) - (By mouth) Why this medicine is used:  
Treats pain and fever. This medicine is an NSAID. Contact a nurse or doctor right away if you have: 
· Change in how much or how often you urinate · Severe stomach pain, vomiting blood, bloody or black tarry stools · Swelling in your hands, ankles, or feet; rapid weight gain Common side effects: 
· Constipation, diarrhea, gas, mild upset stomach · Ringing in your ears, dizziness, headache © 2017 [employer] is for End User's use only and may not be sold, redistributed or otherwise used for commercial purposes. Laxative, Stimulant Combination (Sirisha-Colace, Doc-Q-Lax, Doculax Plus, Senexon-S) - (By mouth) Why this medicine is used:  
Treats constipation by helping you have a bowel movement. Contact a nurse or doctor right away if you have: 
· Yellow skin or eyes · Dark urine or pale stools, vomiting, loss of appetite, stomach pain Common side effects: 
· Nausea, diarrhea, stomach cramps, bitter taste in mouth · Urine turns a different color © 2017 [employer] is for End User's use only and may not be sold, redistributed or otherwise used for commercial purposes. Introducing \[facility] [place] [facility]\"" & HEALTH SERVICES! [facility] introduces Wanderable patient portal. Now you can access parts of your medical record, email your doctor's office, and request medication refills online. 1. In your internet browser, go to [email or web address] 2. Click on the First Time User? Click Here link in the Sign In box. You will see the New Member Sign Up page. 3. Enter your Wanderable Access Code exactly as it appears below. You will not need to use this code after youve completed the sign-up process. If you do not sign up before the expiration date, you must request a new code. · Wanderable Access Code: [email or web address] Expires: 4/29/2018 11:54 AM 
 
4. Enter the last four digits of your Social Security Number (xxxx) and Date of Birth (mm/dd/yyyy) as indicated and click Submit. You will be taken to the next sign-up page. 5. Create a Wanderable ID. This will be your Wanderable login ID and cannot be changed, so think of one that is secure and easy to remember. 6. Create a Wanderable password. You can change your password at any time. 7. Enter your Password Reset Question and Answer. This can be used at a later time if you forget your password. 8. Enter your e-mail address. You will receive e-mail notification when new information is available in [address] 9. Click Sign Up. You can now view and download portions of your medical record. 10. Click the Download Summary menu link to download a portable copy of your medical information. If you have questions, please visit the Frequently Asked Questions section of the Wanderable website. Remember, Wanderable is NOT to be used for urgent needs. For medical emergencies, dial 911. Now available from your iPhone and Android! Introducing Lan Ochoa As a [facility] patient, I wanted to make you aware of our electronic visit tool called Lan Ochoa. [employer] 24/7 allows you to connect within minutes with a medical provider 24 hours a day, seven days a week via a mobile device or tablet or logging into a secure website from your computer. You can access [employer] from anywhere in the [place]. A virtual visit might be right for you when you have a simple condition and feel like you just dont want to get out of bed, or cant get away from work for an appointment, when your regular [employer] provider is not available (evenings, weekends or holidays), or when youre out of town and need minor care. Electronic visits cost only $49 and if the [employer] 24/7 provider determines a prescription is needed to treat your condition, one can be electronically transmitted to a nearby pharmacy*. Please take a moment to enroll today if you have not already done so. The enrollment process is free and takes just a few minutes. To enroll, please download the [employer] 24/7 analy to your tablet or phone, or visit [email or web address] to enroll on your computer. And, as an [address] [place] [address] patient with a [employer] account, the results of your visits will be scanned into your electronic medical record and your primary care provider will be able to view the scanned results. We urge you to continue to see your regular [employer] provider for your ongoing medical care. And while your primary care provider may not be the one available when you seek a [employer] virtual visit, the peace of mind you get from getting a real diagnosis real time can be priceless. For more information on [employer], view our Frequently Asked Questions (FAQs) at [email or web address]. Sincerely, 
 
Olivia Arthur MD 
Chief Medical Officer Estefani Coleman *:  certain medications cannot be prescribed via [employer] Providers Seen During Your Hospitalization Provider Specialty Primary office phone Ignacio Packer MD General Surgery [phone] Your Primary Care Physician (PCP) Primary Care Physician Office Phone Office Fax Sammie Schneider [phone] You are allergic to the following Allergen Reactions Codeine Other (comments) Keeps him awake Recent Documentation Height Weight BMI Smoking Status 1.575 m 57.9 kg 23.35 kg/m2 Former Smoker Emergency Contacts Name Discharge Info Relation Home Work Mobile Sarkis Harvey DISCHARGE CAREGIVER [3] Spouse [3] [phone] Jenny Villareal  Spouse [3] [phone] Patient Belongings The following personal items are in your possession at time of discharge: 
  Dental Appliances: With patient  Visual Aid: Glasses      Home Medications: None   Jewelry: Ring  Clothing:  (clothing and shoes)    Other Valuables: None  Personal Items Sent to Safe: declined Please provide this summary of care documentation to your next provider. Signatures-by signing, you are acknowledging that this After Visit Summary has been reviewed with you and you have received a copy. Patient Signature:  ____________________________________________________________ Date:  ____________________________________________________________  
  
Yassy Guera Provider Signature:  ____________________________________________________________ Date:  ____________________________________________________________

## 2018-04-26 NOTE — PERIOP NOTES
Pt. For discharge. VSs,denies pain or nausea. O2 sats low at 85-89%. On O2 sats 90%. Patient wears oxygen at home. Dr. Isabel Masters called and saw pt.& Dr. Eveline Rivera saw pt. May discharge home and use o2 at home. Reviewed d/c instruction, Rx., & F/U care. IV d/c'd and pt. Discharged home.

## 2018-04-26 NOTE — H&P (VIEW-ONLY)
HISTORY OF PRESENT ILLNESS  Ken Lopez is a 71 y.o. male who comes in for consultation by Dr Latanya Lam for a hernia  HPI  He noted right groin swelling about 2 weeks ago. It is occasionally very painful but most of the time just sore. He does not recall an inciting event. He does have COPD and does a lot of coughing. He denies associated nausea, vomiting, diarrhea, constipation, melena, hematochezia, dysuria or hematuria. The bulge reduces when laying flat. He previously had a left inguinal hernia repaired with mesh. Past Medical History:   Diagnosis Date    Calculus of kidney     COPD     Hypertension     Ill-defined condition     oxygen as needed--2 liters     Past Surgical History:   Procedure Laterality Date    COLONOSCOPY N/A 6/30/2017    COLONOSCOPY performed by Jorge Metzger [facility] at [facility]    HX CATARACT REMOVAL      bilateral    HX HERNIA REPAIR      HX OTHER SURGICAL      colonoscopy     Family History   Problem Relation Age of Onset    Heart Disease Brother      Social History   Substance Use Topics    Smoking status: Former Smoker     Packs/day: 1.00     Quit date: 6/29/2005    Smokeless tobacco: Never Used    Alcohol use No     Current Outpatient Prescriptions   Medication Sig    hydroCHLOROthiazide (HYDRODIURIL) 12.5 mg tablet Take 12.5 mg by mouth daily.  tiotropium (SPIRIVA) 18 mcg inhalation capsule Take 1 Cap by inhalation daily.  primidone (MYSOLINE) 250 mg tablet Take 1,000 mg by mouth daily.  fluticasone-salmeterol (ADVAIR) 500-50 mcg/dose diskus inhaler Take 1 Puff by inhalation every twelve (12) hours.  pravastatin (PRAVACHOL) 20 mg tablet Take 40 mg by mouth nightly.  montelukast (SINGULAIR) 10 mg tablet Take 10 mg by mouth daily.  aspirin delayed-release 81 mg tablet Take 81 mg by mouth daily.  OTHER 2 Puffs two (2) times a day.  GUAIFENESIN/DEXTROMETHORPHAN (MUCINEX DM PO) Take  by mouth daily.     albuterol (PROVENTIL HFA, VENTOLIN HFA, PROAIR HFA) 90 mcg/actuation inhaler Take 1 Puff by inhalation every four (4) hours as needed for Wheezing. No current facility-administered medications for this visit. Allergies   Allergen Reactions    Codeine Other (comments)     Keeps him awake       Review of Systems   Constitutional: Negative for chills, diaphoresis, fever, malaise/fatigue and weight loss. HENT: Negative for congestion, ear pain and sore throat. Eyes: Negative for blurred vision and pain. Respiratory: Positive for shortness of breath. Negative for cough, hemoptysis, sputum production, wheezing and stridor. Cardiovascular: Negative for chest pain, palpitations, orthopnea, claudication, leg swelling and PND. Gastrointestinal: Positive for abdominal pain. Negative for blood in stool, constipation, diarrhea, heartburn, melena, nausea and vomiting. Genitourinary: Negative for dysuria, flank pain, frequency, hematuria and urgency. Musculoskeletal: Negative for back pain, joint pain, myalgias and neck pain. Skin: Negative for itching and rash. Neurological: Negative for dizziness, tremors, focal weakness, seizures, weakness and headaches. Endo/Heme/Allergies: Negative for polydipsia. Psychiatric/Behavioral: Negative for depression and memory loss. The patient is not nervous/anxious. Visit Vitals    /86 (BP 1 Location: Right arm, BP Patient Position: Sitting)    Pulse 90    Ht 5' 2\" (1.575 m)    Wt 59.9 kg (132 lb)    SpO2 90%    BMI 24.14 kg/m2       Physical Exam   Constitutional: He is oriented to person, place, and time. He appears well-developed and well-nourished. No distress. HENT:   Head: Normocephalic and atraumatic. Mouth/Throat: Oropharynx is clear and moist. No oropharyngeal exudate. Eyes: Conjunctivae and EOM are normal. Pupils are equal, round, and reactive to light. No scleral icterus. Neck: Normal range of motion. Neck supple. No tracheal deviation present.  No thyromegaly present. Cardiovascular: Normal rate, regular rhythm and normal heart sounds. Exam reveals no gallop and no friction rub. No murmur heard. Pulmonary/Chest: Effort normal and breath sounds normal. No stridor. No respiratory distress. He has no wheezes. He has no rales. Abdominal: Soft. Normal appearance and bowel sounds are normal. He exhibits no distension, no pulsatile liver and no mass. There is no hepatosplenomegaly. There is no tenderness. There is no rebound, no guarding and no CVA tenderness. A hernia is present. Hernia confirmed positive in the right inguinal area (reducbile small-medium sized ?direct). Hernia confirmed negative in the ventral area and confirmed negative in the left inguinal area. Genitourinary: Testes normal and penis normal. Cremasteric reflex is present. Circumcised. Musculoskeletal: Normal range of motion. He exhibits no edema or tenderness. Lymphadenopathy:     He has no cervical adenopathy. Right: No inguinal adenopathy present. Left: No inguinal adenopathy present. Neurological: He is alert and oriented to person, place, and time. No cranial nerve deficit. Coordination normal.   Skin: Skin is warm and dry. No rash noted. He is not diaphoretic. No erythema. Psychiatric: He has a normal mood and affect. His behavior is normal. Judgment and thought content normal.       ASSESSMENT and PLAN  1. Symptomatic right inguinal hernia. I explained about the anatomy and pathophysiology of hernias and the risk of incarceration and strangulation of the bowel. I explained about hernia repairs (open with and without mesh, and robotic assisted and laparoscopic with mesh).   I explained the risks and benefits of repair including bleeding, infection, chronic pain, orchalgia, loss of testes, bowel or bladder injury, hernia recurrence, seroma, mesh infection requiring removal.  I explained it would be a six to eight week recuperation with no driving for 5 - 7 days, no lifting for six weeks. 2.  COPD. No longer smoking. Followed by Dr Aparna Tirado. On rx  3. Essential hypertension. Followed by Dr Sandra Oswald  4.   Hand tremor        He wishes to proceed with a right inguinal hernia repair with mesh under MAC anesthesia as an outpatient      Sejal Maki MD FACS

## 2018-04-26 NOTE — DISCHARGE INSTRUCTIONS
Discharge Instructions:  Hernia Repair    Dr. Yaima Jiménez    Call for appointment for follow up in 3 weeks [phone]    Activity:    Walk regularly. No lifting more than 5 to 10 pounds for 6 weeks. Light aerobic activity is okay when you feel up to it. You may resume driving in five days unless still requiring narcotics for pain. Work:    You may return to work in 2 or 3 weeks to light activity. No lifting more than 5 pounds for four weeks and no more than 10 pounds for an additional 2 weeks. Diet:    You may resume normal diet after 24 hours. Anesthesia and narcotics may cause nausea and vomiting. If persistent please call the office. Wound Care: You have a special dressing called Dermabond. It is okay to shower and let the water run over the incisions but do not scrub the area or soak in a tub. If you have a small amount of drainage you may place a dry bandage over the wound and change it daily. If you experience a lot of drainage, develop redness around the wound, or a fever over 101 F occurs please call the office. May use ice over incision for comfort. Medications:    Resume home medications as indicated on the Medical Reconciliation form. Aspirin and Coumadin can be restarted immediately if you were taking them preoperatively. If taking Plavix do not restart it until post operative day 2. Pain medications:  Non steroidal antiinflammatories seem to work best for post surgical pain. Try these first as prescribed. A narcotic prescription will also be given for breakthrough pain. Over the counter stool softeners and laxatives may be used if needed. Do not hesitate to call with questions or concerns.         DISCHARGE SUMMARY from Nurse    PATIENT INSTRUCTIONS:    After general anesthesia or intravenous sedation, for 24 hours or while taking prescription Narcotics:  · Limit your activities  · Do not drive and operate hazardous machinery  · Do not make important personal or business decisions  · Do  not drink alcoholic beverages  · If you have not urinated within 8 hours after discharge, please contact your surgeon on call. Report the following to your surgeon:  · Excessive pain, swelling, redness or odor of or around the surgical area  · Temperature over 100.5  · Nausea and vomiting lasting longer than 4 hours or if unable to take medications  · Any signs of decreased circulation or nerve impairment to extremity: change in color, persistent  numbness, tingling, coldness or increase pain  · Any questions    What to do at Home:      *  Please give a list of your current medications to your Primary Care Provider. *  Please update this list whenever your medications are discontinued, doses are      changed, or new medications (including over-the-counter products) are added. *  Please carry medication information at all times in case of emergency situations. These are general instructions for a healthy lifestyle:    No smoking/ No tobacco products/ Avoid exposure to second hand smoke  Surgeon General's Warning:  Quitting smoking now greatly reduces serious risk to your health. Obesity, smoking, and sedentary lifestyle greatly increases your risk for illness    A healthy diet, regular physical exercise & weight monitoring are important for maintaining a healthy lifestyle    You may be retaining fluid if you have a history of heart failure or if you experience any of the following symptoms:  Weight gain of 3 pounds or more overnight or 5 pounds in a week, increased swelling in our hands or feet or shortness of breath while lying flat in bed. Please call your doctor as soon as you notice any of these symptoms; do not wait until your next office visit.     Recognize signs and symptoms of STROKE:    F-face looks uneven    A-arms unable to move or move unevenly    S-speech slurred or non-existent    T-time-call 911 as soon as signs and symptoms begin-DO NOT go       Back to bed or wait to see if you get better-TIME IS BRAIN. Warning Signs of HEART ATTACK     Call 911 if you have these symptoms:   Chest discomfort. Most heart attacks involve discomfort in the center of the chest that lasts more than a few minutes, or that goes away and comes back. It can feel like uncomfortable pressure, squeezing, fullness, or pain.  Discomfort in other areas of the upper body. Symptoms can include pain or discomfort in one or both arms, the back, neck, jaw, or stomach.  Shortness of breath with or without chest discomfort.  Other signs may include breaking out in a cold sweat, nausea, or lightheadedness. Don't wait more than five minutes to call 911 - MINUTES MATTER! Fast action can save your life. Calling 911 is almost always the fastest way to get lifesaving treatment. Emergency Medical Services staff can begin treatment when they arrive -- up to an hour sooner than if someone gets to the hospital by car. The discharge information has been reviewed with the patient and caregiver. The patient and caregiver verbalized understanding. Discharge medications reviewed with the patient and caregiver and appropriate educational materials and side effects teaching were provided. ___________________________________________________________________________________________________________________________________              How to Care for Your Wound After Its Treated With  DERMABOND* Topical Skin Adhesive  DERMABOND* Topical Skin Adhesive (2-octyl cyanoacrylate) is a sterile, liquid skin adhesive  that holds wound edges together. The film will usually remain in place for 5 to 10 days, then  naturally fall off your skin. The following will answer some of your questions and provide instructions for proper care for your  wound while it is healing:    CHECK WOUND APPEARANCE   Some swelling, redness, and pain are common with all wounds and normally will go away as the  wound heals.  If swelling, redness, or pain increases or if the wound feels warm to the touch,  contact a doctor. Also contact a doctor if the wound edges reopen or separate. REPLACE BANDAGES   If your wound is bandaged, keep the bandage dry.  Replace the dressing daily until the adhesive film has fallen off or if the  bandage should become wet, unless otherwise instructed by your  physician.  When changing the dressing, do not place tape directly over the  DERMABOND adhesive film, because removing the tape later may also  remove the film. AVOID TOPICAL MEDICATIONS   Do not apply liquid or ointment medications or any other product to your wound while the  DERMABOND adhesive film is in place. These may loosen the film before your wound is healed. KEEP WOUND DRY AND PROTECTED   You may occasionally and briefly wet your wound in the shower or bath. Do not soak or scrub  your wound, do not swim, and avoid periods of heavy perspiration until the DERMABOND  adhesive has naturally fallen off. After showering or bathing, gently blot your wound dry with a  soft towel. If a protective dressing is being used, apply a fresh, dry bandage, being sure to keep  the tape off the DERMABOND adhesive film.  Apply a clean, dry bandage over the wound if necessary to protect it.  Protect your wound from injury until the skin has had sufficient time to heal.   Do not scratch, rub, or pick at the DERMABOND adhesive film. This may loosen the film before  your wound is healed.  Protect the wound from prolonged exposure to sunlight or tanning lamps while the film is in  place. If you have any questions or concerns about this product, please consult your doctor. *Trademark ©[employer] 2002  A common side effect of anesthesia following surgery is nausea and/or vomiting. In order to decrease symptoms, it is wise to avoid foods that are high in fat, greasy foods, milk products, and spicy foods for the first 24 hours.     Acceptable foods for the first 24 hours following surgery include but are not limited to:     soup   broth    toast    crackers    applesauce    bananas    mashed potatoes,   soft or scrambled eggs   oatmeal    jello    It is important to eat when taking your pain medication. This will help to prevent nausea. If possible, please try to time your meals with your medications. It is very important to stay hydrated following surgery. Sip fluids frequently while awake. Avoid acidic drinks such as citrus juices and soda for 24 hours. Carbonated beverages may cause bloating and gas. Acceptable fluids include:    - water (flavor packets may add variety)  - coffee or tea (in moderation)  - Gatorade  - Ignacio-aid  - apple juice  - cranberry juice    You are encouraged to cough and deep breathe every hour when awake. This will help to prevent respiratory complications following anesthesia. You may want to hug a pillow when coughing and sneezing to add additional support to the surgical area and to decrease discomfort if you had abdominal or chest surgery. If you are discharged home with support stockings, you may remove them after 24 hours. Support stockings are used to help prevent blood clots in the legs following surgery. Please take time to review all of your Home Care Instructions and Medication Information sheets provided in your discharge packet. If you have any questions, please contact your surgeons office. Thank you. Narcotic-Analgesic/Acetaminophen (Percocet, Norco, Lorcet HD, Lortab 10/325) - (By mouth)   Why this medicine is used:   Relieves pain.   Contact a nurse or doctor right away if you have:  · Extreme weakness, shallow breathing, slow heartbeat  · Severe confusion, lightheadedness, dizziness, fainting  · Yellow skin or eyes, dark urine or pale stools  · Severe constipation, severe stomach pain, nausea, vomiting, loss of appetite  · Sweating or cold, clammy skin     Common side effects:  · Mild constipation, nausea, vomiting  · Sleepiness, tiredness  · Itching, rash  © 2017 [address] Information is for End User's use only and may not be sold, redistributed or otherwise used for commercial purposes. Ibuprofen (Advil, Advil Children's, Motrin, Children's Ibuprofen) - (By mouth)   Why this medicine is used:   Treats pain and fever. This medicine is an NSAID. Contact a nurse or doctor right away if you have:  · Change in how much or how often you urinate  · Severe stomach pain, vomiting blood, bloody or black tarry stools  · Swelling in your hands, ankles, or feet; rapid weight gain     Common side effects:  · Constipation, diarrhea, gas, mild upset stomach  · Ringing in your ears, dizziness, headache  © 2017 [address] is for End User's use only and may not be sold, redistributed or otherwise used for commercial purposes. Laxative, Stimulant Combination (Sirisha-Colace, Doc-Q-Lax, Doculax Plus, Senexon-S) - (By mouth)   Why this medicine is used:   Treats constipation by helping you have a bowel movement. Contact a nurse or doctor right away if you have:  · Yellow skin or eyes  · Dark urine or pale stools, vomiting, loss of appetite, stomach pain     Common side effects:  · Nausea, diarrhea, stomach cramps, bitter taste in mouth  · Urine turns a different color  © 2017 [address] Information is for End User's use only and may not be sold, redistributed or otherwise used for commercial purposes.

## 2018-04-26 NOTE — PERIOP NOTES
Handoff Report from Operating Room to PACU    Report received from SEFERINO Marks and Nadiya Nuñez CRNA regarding Kaarn Rodríguez. Surgeon(s):  Audrey Patel MD  And Procedure(s) (LRB):  RIGHT INGUINAL HERNIA REPAIR WITH MESH (Right)  confirmed   with allergies, drains and dressings discussed. Anesthesia type, drugs, patient history, complications, estimated blood loss, vital signs, intake and output, and last pain medication, lines, reversal medications and temperature were reviewed.

## 2018-04-26 NOTE — INTERVAL H&P NOTE
H&P Update:  Nicole Newman was seen and examined. History and physical has been reviewed. The patient has been examined.  There have been no significant clinical changes since the completion of the originally dated History and Physical.    Signed By: Karen Mccall MD     April 26, 2018 7:09 AM

## 2018-04-26 NOTE — OP NOTES
[address] REPORT    Rich Lopez  MR#: [id number]  : 1945  ACCOUNT #: [de-identified]   DATE OF SERVICE: 2018    PREOPERATIVE DIAGNOSIS:  Symptomatic right inguinal hernia. POSTOPERATIVE DIAGNOSIS:  Symptomatic right inguinal hernia. PROCEDURE PERFORMED:  Right inguinal hernia repair with mesh. SURGEON:  Jesus Delgado. Chanel Galindo MD    ANESTHESIA:  MAC.    ESTIMATED BLOOD LOSS:  10 mL. SPECIMENS REMOVED:  Hernia sac. ASSISTANT:  Leandro Thompson MD     IMPLANTS:  Bard Davol hernia mesh flat SHT, 7.5 x 15 cm, lot number [id number]. COMPLICATIONS:  None. FINDINGS:  Direct and indirect right inguinal hernia. BRIEF HISTORY:  The patient is a 69-year-old gentleman with symptomatic right inguinal hernia. Options were discussed. He elected to undergo repair. He understands the risks and benefits and wishes to proceed. DESCRIPTION OF PROCEDURE:  The patient was taken to the operating room and placed on the operating table in the supine position, underwent IV sedation, and the right groin was prepped and draped in the usual sterile fashion. After appropriate timeout and antibiotics were given, 1% lidocaine with epinephrine and sodium bicarbonate was infiltrated into the skin and subcutaneous tissues, 1 cm medial and 1 cm superior to anterior-superior iliac crest for regional nerve block, as well as the skin and subcutaneous tissue obliquely in the right groin. Oblique incision was made in the right groin, subcutaneous tissue was dissected down sharply. Crossing vein was doubly ligated and divided. Dissection was carried down through Nicolás's fascia, down on the aponeurosis of the external oblique. The aponeurosis was opened up along the length of the fibers, opening up the external ring, and the cord was mobilized up. There was an indirect defect as well as a direct defect.   The indirect defect was dissected away from the cord structures and entered. There was no evidence of a sliding component. A 0 silk pursestring was used to do a high ligation of the hernia sac and a suture ligature placed distal to that. The sac was then amputated off. Once that was completed, a piece of 3 x 6 Bard soft mesh was brought on the field. A slit was created 1/3 of the way down the short end in order to facilitate a keyhole for creation of new internal ring. Interrupted 0 Prolene suture was used to tack the mesh down from medial to the fascia within the pubic tubercle, then under Paul's ligament and up to shelving edge of the inguinal ligament more superolaterally. Medially, it was sewn to the conjoint tendon, taking special care to not entrap the nerves in any of the sutures. The tails of the mesh were wrapped around the cord structures and sewn back to themselves, creating a snug but not tight internal ring, and the tails placed underneath the aponeurosis of the external oblique. Then, 0.5% plain Marcaine was injected around the nerves and cord structures as well as skin and subcutaneous tissues. Next, a running layer of 3-0 Vicryl was used to reapproximate the aponeurosis of the external oblique, creating a new external ring. Another running layer of 3-0 Vicryl was used to reapproximate Nicolás's fascia and a running layer 4-0 Vicryl was used to close the skin. Dermabond dressing was then applied. Upon completion of the operation, needle, sponge and instrument counts were correct x 2. The patient had tolerated the procedure well and was extubated, was allowed to awaken and was brought to the recovery room. MD Prabha Ruano / SEAN  D: 04/26/2018 08:32     T: 04/26/2018 11:03  JOB #: [id number]  CC: Rivera Mcclelland MD  CC: Valeria Antony MD

## 2018-05-16 ENCOUNTER — OFFICE VISIT (OUTPATIENT)
Dept: SURGERY | Age: 73
End: 2018-05-16

## 2018-05-16 VITALS
BODY MASS INDEX: 23.46 KG/M2 | DIASTOLIC BLOOD PRESSURE: 83 MMHG | RESPIRATION RATE: 16 BRPM | OXYGEN SATURATION: 92 % | HEIGHT: 62 IN | WEIGHT: 127.5 LBS | SYSTOLIC BLOOD PRESSURE: 144 MMHG | HEART RATE: 89 BPM | TEMPERATURE: 96.5 F

## 2018-05-16 DIAGNOSIS — Z09 POSTOPERATIVE EXAMINATION: Primary | ICD-10-CM

## 2018-05-16 RX ORDER — PRAVASTATIN SODIUM 40 MG/1
TABLET ORAL
COMMUNITY

## 2018-05-16 NOTE — PROGRESS NOTES
Surgery  Follow up  Procedure: RIH with mesh  OR date:  4/26/2018  Path:  sac    S I feel fine, no drainage    Visit Vitals    /83 (BP 1 Location: Left arm, BP Patient Position: Sitting)    Pulse 89    Temp 96.5 °F (35.8 °C) (Oral)    Resp 16    Ht 5' 2\" (1.575 m)    Wt 57.8 kg (127 lb 8 oz)    SpO2 92%  Comment: Pt is on oxygen at home 2 liters.     BMI 23.32 kg/m2       O Incisions healing well without infection   No signs of hernia    A/P Doing well   No lifting for another 3 weeks      RTC 6 weeks or prn    La Nena Will MD FACS

## 2018-05-16 NOTE — PROGRESS NOTES
Chief Complaint   Patient presents with    Surgical Follow-up     po ing hernia repair 4/26/18     1. Have you been to the ER, urgent care clinic since your last visit? Hospitalized since your last visit? No    2. Have you seen or consulted any other health care providers outside of the [address] [place] [address] since your last visit? Include any pap smears or colon screening.  No

## 2018-07-20 ENCOUNTER — HOSPITAL ENCOUNTER (OUTPATIENT)
Dept: CT IMAGING | Age: 73
Discharge: HOME | End: 2018-07-20
Attending: INTERNAL MEDICINE
Payer: MEDICARE

## 2018-07-20 VITALS — HEIGHT: 64 IN | WEIGHT: 130 LBS | BODY MASS INDEX: 22.2 KG/M2

## 2018-07-20 DIAGNOSIS — Z87.891 PERSONAL HISTORY OF TOBACCO USE, PRESENTING HAZARDS TO HEALTH: ICD-10-CM

## 2018-07-20 PROCEDURE — G0297 LDCT FOR LUNG CA SCREEN: HCPCS

## 2018-11-24 ENCOUNTER — HOSPITAL ENCOUNTER (INPATIENT)
Age: 73
LOS: 4 days | Discharge: HOME | DRG: 871 | End: 2018-11-28
Attending: EMERGENCY MEDICINE | Admitting: HOSPITALIST
Payer: MEDICARE

## 2018-11-24 ENCOUNTER — APPOINTMENT (OUTPATIENT)
Dept: GENERAL RADIOLOGY | Age: 73
DRG: 871 | End: 2018-11-24
Attending: EMERGENCY MEDICINE
Payer: MEDICARE

## 2018-11-24 ENCOUNTER — APPOINTMENT (OUTPATIENT)
Dept: CT IMAGING | Age: 73
DRG: 871 | End: 2018-11-24
Attending: EMERGENCY MEDICINE
Payer: MEDICARE

## 2018-11-24 DIAGNOSIS — J96.21 ACUTE ON CHRONIC RESPIRATORY FAILURE WITH HYPOXIA (HCC): ICD-10-CM

## 2018-11-24 DIAGNOSIS — A41.9 SEVERE SEPSIS (HCC): ICD-10-CM

## 2018-11-24 DIAGNOSIS — J18.9 PNEUMONIA DUE TO INFECTIOUS ORGANISM, UNSPECIFIED LATERALITY, UNSPECIFIED PART OF LUNG: Primary | ICD-10-CM

## 2018-11-24 DIAGNOSIS — R65.20 SEVERE SEPSIS (HCC): ICD-10-CM

## 2018-11-24 PROBLEM — J44.1 COPD EXACERBATION (HCC): Status: ACTIVE | Noted: 2018-11-24

## 2018-11-24 LAB
ALBUMIN SERPL-MCNC: 3.5 G/DL (ref 3.5–5)
ALBUMIN/GLOB SERPL: 0.7 {RATIO} (ref 1.1–2.2)
ALP SERPL-CCNC: 79 U/L (ref 45–117)
ALT SERPL-CCNC: 21 U/L (ref 12–78)
ANION GAP BLD CALC-SCNC: 12 MMOL/L (ref 10–20)
ANION GAP SERPL CALC-SCNC: 5 MMOL/L (ref 5–15)
APPEARANCE UR: CLEAR
AST SERPL-CCNC: 15 U/L (ref 15–37)
BACTERIA URNS QL MICRO: NEGATIVE /HPF
BASOPHILS # BLD: 0 K/UL (ref 0–0.1)
BASOPHILS NFR BLD: 0 % (ref 0–1)
BILIRUB SERPL-MCNC: 0.3 MG/DL (ref 0.2–1)
BILIRUB UR QL: NEGATIVE
BNP SERPL-MCNC: 54 PG/ML (ref 0–125)
BUN BLD-MCNC: 9 MG/DL (ref 9–20)
BUN SERPL-MCNC: 8 MG/DL (ref 6–20)
BUN/CREAT SERPL: 10 (ref 12–20)
CA-I BLD-MCNC: 1.03 MMOL/L (ref 1.12–1.32)
CALCIUM SERPL-MCNC: 8.6 MG/DL (ref 8.5–10.1)
CHLORIDE BLD-SCNC: 87 MMOL/L (ref 98–107)
CHLORIDE SERPL-SCNC: 89 MMOL/L (ref 97–108)
CO2 BLD-SCNC: 37 MMOL/L (ref 21–32)
CO2 SERPL-SCNC: 35 MMOL/L (ref 21–32)
COLOR UR: ABNORMAL
COMMENT, HOLDF: NORMAL
CREAT BLD-MCNC: 0.7 MG/DL (ref 0.6–1.3)
CREAT SERPL-MCNC: 0.79 MG/DL (ref 0.7–1.3)
DIFFERENTIAL METHOD BLD: ABNORMAL
EOSINOPHIL # BLD: 0.3 K/UL (ref 0–0.4)
EOSINOPHIL NFR BLD: 2 % (ref 0–7)
EPITH CASTS URNS QL MICRO: ABNORMAL /LPF
ERYTHROCYTE [DISTWIDTH] IN BLOOD BY AUTOMATED COUNT: 17.8 % (ref 11.5–14.5)
FLUAV AG NPH QL IA: NEGATIVE
FLUBV AG NOSE QL IA: NEGATIVE
GLOBULIN SER CALC-MCNC: 5 G/DL (ref 2–4)
GLUCOSE BLD-MCNC: 101 MG/DL (ref 65–100)
GLUCOSE SERPL-MCNC: 95 MG/DL (ref 65–100)
GLUCOSE UR STRIP.AUTO-MCNC: NEGATIVE MG/DL
HCT VFR BLD AUTO: 44.9 % (ref 36.6–50.3)
HCT VFR BLD CALC: 48 % (ref 36.6–50.3)
HGB BLD-MCNC: 15.1 G/DL (ref 12.1–17)
HGB UR QL STRIP: ABNORMAL
HYALINE CASTS URNS QL MICRO: ABNORMAL /LPF (ref 0–5)
IMM GRANULOCYTES # BLD: 0.1 K/UL (ref 0–0.04)
IMM GRANULOCYTES NFR BLD AUTO: 1 % (ref 0–0.5)
KETONES UR QL STRIP.AUTO: NEGATIVE MG/DL
LACTATE SERPL-SCNC: 1.4 MMOL/L (ref 0.4–2)
LEUKOCYTE ESTERASE UR QL STRIP.AUTO: NEGATIVE
LYMPHOCYTES # BLD: 0.8 K/UL (ref 0.8–3.5)
LYMPHOCYTES NFR BLD: 6 % (ref 12–49)
MCH RBC QN AUTO: 27.2 PG (ref 26–34)
MCHC RBC AUTO-ENTMCNC: 33.6 G/DL (ref 30–36.5)
MCV RBC AUTO: 80.8 FL (ref 80–99)
MONOCYTES # BLD: 1.4 K/UL (ref 0–1)
MONOCYTES NFR BLD: 11 % (ref 5–13)
NEUTS SEG # BLD: 10.1 K/UL (ref 1.8–8)
NEUTS SEG NFR BLD: 80 % (ref 32–75)
NITRITE UR QL STRIP.AUTO: NEGATIVE
NRBC # BLD: 0 K/UL (ref 0–0.01)
NRBC BLD-RTO: 0 PER 100 WBC
PH UR STRIP: 7 [PH] (ref 5–8)
PLATELET # BLD AUTO: 299 K/UL (ref 150–400)
PMV BLD AUTO: 9.8 FL (ref 8.9–12.9)
POTASSIUM BLD-SCNC: 5.7 MMOL/L (ref 3.5–5.1)
POTASSIUM SERPL-SCNC: 3.3 MMOL/L (ref 3.5–5.1)
PROT SERPL-MCNC: 8.5 G/DL (ref 6.4–8.2)
PROT UR STRIP-MCNC: NEGATIVE MG/DL
RBC # BLD AUTO: 5.56 M/UL (ref 4.1–5.7)
RBC #/AREA URNS HPF: ABNORMAL /HPF (ref 0–5)
RBC MORPH BLD: ABNORMAL
SAMPLES BEING HELD,HOLD: NORMAL
SERVICE CMNT-IMP: ABNORMAL
SODIUM BLD-SCNC: 129 MMOL/L (ref 136–145)
SODIUM SERPL-SCNC: 129 MMOL/L (ref 136–145)
SP GR UR REFRACTOMETRY: 1.02 (ref 1–1.03)
UA: UC IF INDICATED,UAUC: ABNORMAL
UROBILINOGEN UR QL STRIP.AUTO: 0.2 EU/DL (ref 0.2–1)
WBC # BLD AUTO: 12.7 K/UL (ref 4.1–11.1)
WBC MORPH BLD: ABNORMAL
WBC URNS QL MICRO: ABNORMAL /HPF (ref 0–4)

## 2018-11-24 PROCEDURE — 87804 INFLUENZA ASSAY W/OPTIC: CPT

## 2018-11-24 PROCEDURE — 74011000250 HC RX REV CODE- 250: Performed by: EMERGENCY MEDICINE

## 2018-11-24 PROCEDURE — 99285 EMERGENCY DEPT VISIT HI MDM: CPT

## 2018-11-24 PROCEDURE — 94640 AIRWAY INHALATION TREATMENT: CPT

## 2018-11-24 PROCEDURE — 3E03329 INTRODUCTION OF OTHER ANTI-INFECTIVE INTO PERIPHERAL VEIN, PERCUTANEOUS APPROACH: ICD-10-PCS | Performed by: EMERGENCY MEDICINE

## 2018-11-24 PROCEDURE — 81001 URINALYSIS AUTO W/SCOPE: CPT

## 2018-11-24 PROCEDURE — 87040 BLOOD CULTURE FOR BACTERIA: CPT

## 2018-11-24 PROCEDURE — 96375 TX/PRO/DX INJ NEW DRUG ADDON: CPT

## 2018-11-24 PROCEDURE — 71045 X-RAY EXAM CHEST 1 VIEW: CPT

## 2018-11-24 PROCEDURE — 74011000250 HC RX REV CODE- 250: Performed by: HOSPITALIST

## 2018-11-24 PROCEDURE — 93005 ELECTROCARDIOGRAM TRACING: CPT

## 2018-11-24 PROCEDURE — 74011000258 HC RX REV CODE- 258: Performed by: EMERGENCY MEDICINE

## 2018-11-24 PROCEDURE — 83880 ASSAY OF NATRIURETIC PEPTIDE: CPT

## 2018-11-24 PROCEDURE — 83605 ASSAY OF LACTIC ACID: CPT

## 2018-11-24 PROCEDURE — 65660000000 HC RM CCU STEPDOWN

## 2018-11-24 PROCEDURE — 71275 CT ANGIOGRAPHY CHEST: CPT

## 2018-11-24 PROCEDURE — 36415 COLL VENOUS BLD VENIPUNCTURE: CPT

## 2018-11-24 PROCEDURE — 80047 BASIC METABLC PNL IONIZED CA: CPT

## 2018-11-24 PROCEDURE — 80053 COMPREHEN METABOLIC PANEL: CPT

## 2018-11-24 PROCEDURE — 74011636320 HC RX REV CODE- 636/320: Performed by: EMERGENCY MEDICINE

## 2018-11-24 PROCEDURE — 77010033678 HC OXYGEN DAILY

## 2018-11-24 PROCEDURE — 96365 THER/PROPH/DIAG IV INF INIT: CPT

## 2018-11-24 PROCEDURE — 74011250636 HC RX REV CODE- 250/636: Performed by: EMERGENCY MEDICINE

## 2018-11-24 PROCEDURE — 94760 N-INVAS EAR/PLS OXIMETRY 1: CPT

## 2018-11-24 PROCEDURE — 77030029684 HC NEB SM VOL KT MONA -A

## 2018-11-24 PROCEDURE — 74011250637 HC RX REV CODE- 250/637: Performed by: HOSPITALIST

## 2018-11-24 PROCEDURE — 85025 COMPLETE CBC W/AUTO DIFF WBC: CPT

## 2018-11-24 PROCEDURE — 74011250636 HC RX REV CODE- 250/636: Performed by: HOSPITALIST

## 2018-11-24 RX ORDER — SODIUM CHLORIDE 0.9 % (FLUSH) 0.9 %
5-10 SYRINGE (ML) INJECTION AS NEEDED
Status: DISCONTINUED | OUTPATIENT
Start: 2018-11-24 | End: 2018-11-28 | Stop reason: SDUPTHER

## 2018-11-24 RX ORDER — POTASSIUM CHLORIDE 20 MEQ/1
20 TABLET, EXTENDED RELEASE ORAL
Status: COMPLETED | OUTPATIENT
Start: 2018-11-24 | End: 2018-11-24

## 2018-11-24 RX ORDER — SODIUM CHLORIDE 9 MG/ML
50 INJECTION, SOLUTION INTRAVENOUS
Status: COMPLETED | OUTPATIENT
Start: 2018-11-24 | End: 2018-11-24

## 2018-11-24 RX ORDER — AMLODIPINE BESYLATE 5 MG/1
5 TABLET ORAL DAILY
Status: DISCONTINUED | OUTPATIENT
Start: 2018-11-25 | End: 2018-11-28 | Stop reason: HOSPADM

## 2018-11-24 RX ORDER — IPRATROPIUM BROMIDE AND ALBUTEROL SULFATE 2.5; .5 MG/3ML; MG/3ML
3 SOLUTION RESPIRATORY (INHALATION)
Status: COMPLETED | OUTPATIENT
Start: 2018-11-24 | End: 2018-11-24

## 2018-11-24 RX ORDER — SODIUM CHLORIDE 0.9 % (FLUSH) 0.9 %
5-10 SYRINGE (ML) INJECTION EVERY 8 HOURS
Status: DISCONTINUED | OUTPATIENT
Start: 2018-11-24 | End: 2018-11-28 | Stop reason: HOSPADM

## 2018-11-24 RX ORDER — AZITHROMYCIN 250 MG/1
250 TABLET, FILM COATED ORAL DAILY
Status: DISCONTINUED | OUTPATIENT
Start: 2018-11-25 | End: 2018-11-24

## 2018-11-24 RX ORDER — FLUTICASONE FUROATE AND VILANTEROL 200; 25 UG/1; UG/1
1 POWDER RESPIRATORY (INHALATION) DAILY
Status: DISCONTINUED | OUTPATIENT
Start: 2018-11-25 | End: 2018-11-25 | Stop reason: SDUPTHER

## 2018-11-24 RX ORDER — ENOXAPARIN SODIUM 100 MG/ML
40 INJECTION SUBCUTANEOUS EVERY 24 HOURS
Status: DISCONTINUED | OUTPATIENT
Start: 2018-11-24 | End: 2018-11-24

## 2018-11-24 RX ORDER — SODIUM CHLORIDE 9 MG/ML
75 INJECTION, SOLUTION INTRAVENOUS CONTINUOUS
Status: DISPENSED | OUTPATIENT
Start: 2018-11-24 | End: 2018-11-24

## 2018-11-24 RX ORDER — IPRATROPIUM BROMIDE AND ALBUTEROL SULFATE 2.5; .5 MG/3ML; MG/3ML
3 SOLUTION RESPIRATORY (INHALATION)
Status: DISCONTINUED | OUTPATIENT
Start: 2018-11-24 | End: 2018-11-25

## 2018-11-24 RX ORDER — SODIUM CHLORIDE 9 MG/ML
75 INJECTION, SOLUTION INTRAVENOUS CONTINUOUS
Status: DISCONTINUED | OUTPATIENT
Start: 2018-11-24 | End: 2018-11-24

## 2018-11-24 RX ORDER — BISACODYL 5 MG
5 TABLET, DELAYED RELEASE (ENTERIC COATED) ORAL DAILY PRN
Status: DISCONTINUED | OUTPATIENT
Start: 2018-11-24 | End: 2018-11-28 | Stop reason: HOSPADM

## 2018-11-24 RX ORDER — MONTELUKAST SODIUM 10 MG/1
10 TABLET ORAL DAILY
Status: DISCONTINUED | OUTPATIENT
Start: 2018-11-24 | End: 2018-11-28 | Stop reason: HOSPADM

## 2018-11-24 RX ORDER — PRAVASTATIN SODIUM 40 MG/1
40 TABLET ORAL
Status: DISCONTINUED | OUTPATIENT
Start: 2018-11-24 | End: 2018-11-28 | Stop reason: HOSPADM

## 2018-11-24 RX ORDER — SODIUM CHLORIDE 0.9 % (FLUSH) 0.9 %
10 SYRINGE (ML) INJECTION
Status: COMPLETED | OUTPATIENT
Start: 2018-11-24 | End: 2018-11-24

## 2018-11-24 RX ORDER — PRIMIDONE 250 MG/1
1000 TABLET ORAL DAILY
Status: DISCONTINUED | OUTPATIENT
Start: 2018-11-24 | End: 2018-11-28 | Stop reason: HOSPADM

## 2018-11-24 RX ORDER — HYDRALAZINE HYDROCHLORIDE 20 MG/ML
10 INJECTION INTRAMUSCULAR; INTRAVENOUS
Status: DISCONTINUED | OUTPATIENT
Start: 2018-11-24 | End: 2018-11-28 | Stop reason: HOSPADM

## 2018-11-24 RX ORDER — ACETAMINOPHEN 325 MG/1
650 TABLET ORAL
Status: DISCONTINUED | OUTPATIENT
Start: 2018-11-24 | End: 2018-11-28 | Stop reason: HOSPADM

## 2018-11-24 RX ORDER — SODIUM CHLORIDE 0.9 % (FLUSH) 0.9 %
5-10 SYRINGE (ML) INJECTION AS NEEDED
Status: DISCONTINUED | OUTPATIENT
Start: 2018-11-24 | End: 2018-11-28 | Stop reason: HOSPADM

## 2018-11-24 RX ORDER — MAGNESIUM SULFATE HEPTAHYDRATE 40 MG/ML
2 INJECTION, SOLUTION INTRAVENOUS ONCE
Status: COMPLETED | OUTPATIENT
Start: 2018-11-24 | End: 2018-11-24

## 2018-11-24 RX ORDER — ONDANSETRON 2 MG/ML
4 INJECTION INTRAMUSCULAR; INTRAVENOUS
Status: DISCONTINUED | OUTPATIENT
Start: 2018-11-24 | End: 2018-11-28 | Stop reason: HOSPADM

## 2018-11-24 RX ADMIN — MONTELUKAST SODIUM 10 MG: 10 TABLET, COATED ORAL at 13:41

## 2018-11-24 RX ADMIN — METHYLPREDNISOLONE SODIUM SUCCINATE 60 MG: 125 INJECTION, POWDER, FOR SOLUTION INTRAMUSCULAR; INTRAVENOUS at 17:59

## 2018-11-24 RX ADMIN — Medication 5 ML: at 14:40

## 2018-11-24 RX ADMIN — SODIUM CHLORIDE 1000 ML: 900 INJECTION, SOLUTION INTRAVENOUS at 09:37

## 2018-11-24 RX ADMIN — SODIUM CHLORIDE 75 ML/HR: 900 INJECTION, SOLUTION INTRAVENOUS at 13:40

## 2018-11-24 RX ADMIN — MAGNESIUM SULFATE IN WATER 2 G: 40 INJECTION, SOLUTION INTRAVENOUS at 09:35

## 2018-11-24 RX ADMIN — CEFTRIAXONE 1 G: 1 INJECTION, POWDER, FOR SOLUTION INTRAMUSCULAR; INTRAVENOUS at 10:40

## 2018-11-24 RX ADMIN — POTASSIUM CHLORIDE 20 MEQ: 20 TABLET, EXTENDED RELEASE ORAL at 14:18

## 2018-11-24 RX ADMIN — SODIUM CHLORIDE 50 ML/HR: 900 INJECTION, SOLUTION INTRAVENOUS at 10:29

## 2018-11-24 RX ADMIN — Medication 10 ML: at 10:29

## 2018-11-24 RX ADMIN — PRIMIDONE 1000 MG: 250 TABLET ORAL at 13:41

## 2018-11-24 RX ADMIN — UMECLIDINIUM 1 PUFF: 62.5 AEROSOL, POWDER ORAL at 13:42

## 2018-11-24 RX ADMIN — IPRATROPIUM BROMIDE AND ALBUTEROL SULFATE 3 ML: .5; 3 SOLUTION RESPIRATORY (INHALATION) at 09:24

## 2018-11-24 RX ADMIN — METHYLPREDNISOLONE SODIUM SUCCINATE 60 MG: 125 INJECTION, POWDER, FOR SOLUTION INTRAMUSCULAR; INTRAVENOUS at 21:12

## 2018-11-24 RX ADMIN — Medication 10 ML: at 21:14

## 2018-11-24 RX ADMIN — IPRATROPIUM BROMIDE AND ALBUTEROL SULFATE 3 ML: .5; 3 SOLUTION RESPIRATORY (INHALATION) at 19:53

## 2018-11-24 RX ADMIN — METHYLPREDNISOLONE SODIUM SUCCINATE 125 MG: 125 INJECTION, POWDER, FOR SOLUTION INTRAMUSCULAR; INTRAVENOUS at 09:48

## 2018-11-24 RX ADMIN — IPRATROPIUM BROMIDE AND ALBUTEROL SULFATE 3 ML: .5; 3 SOLUTION RESPIRATORY (INHALATION) at 15:11

## 2018-11-24 RX ADMIN — AZITHROMYCIN MONOHYDRATE 500 MG: 500 INJECTION, POWDER, LYOPHILIZED, FOR SOLUTION INTRAVENOUS at 11:45

## 2018-11-24 RX ADMIN — IPRATROPIUM BROMIDE AND ALBUTEROL SULFATE 3 ML: .5; 3 SOLUTION RESPIRATORY (INHALATION) at 12:47

## 2018-11-24 RX ADMIN — Medication 10 ML: at 21:12

## 2018-11-24 RX ADMIN — PRAVASTATIN SODIUM 40 MG: 40 TABLET ORAL at 21:10

## 2018-11-24 RX ADMIN — IOPAMIDOL 100 ML: 755 INJECTION, SOLUTION INTRAVENOUS at 10:29

## 2018-11-24 NOTE — ED NOTES
Pt transferred from w/c to bed. MD at bedside. This nurse applied nasal canula at 3L for 02 sats in 70's. Pt speaking to MD without taking breaks.

## 2018-11-24 NOTE — ED NOTES
TRANSFER - OUT REPORT: 
 
Verbal report given to Margaret Sher RN(name) on Carla Page  being transferred to gen surg (unit) for routine progression of care Report consisted of patients Situation, Background, Assessment and  
Recommendations(SBAR). Information from the following report(s) SBAR, Kardex, ED Summary and MAR was reviewed with the receiving nurse. Lines:  
Peripheral IV 11/24/18 Left Antecubital (Active) Site Assessment Clean, dry, & intact 11/24/2018  9:51 AM  
Phlebitis Assessment 0 11/24/2018  9:51 AM  
Infiltration Assessment 0 11/24/2018  9:51 AM  
Dressing Status Clean, dry, & intact 11/24/2018  9:51 AM  
  
 
Opportunity for questions and clarification was provided. Patient transported with: 
Transport

## 2018-11-24 NOTE — PROGRESS NOTES
ADULT PROTOCOL: JET AEROSOL ASSESSMENT Patient  Lydia Blanco     71 y.o.   male     11/24/2018  4:20 PM 
 
Breath Sounds Pre Procedure:  Wheezing Breath Sounds Post Procedure:  Wheezing Breathing pattern: Pre procedure Breathing Pattern: Tachypneic Post procedure Breathing Pattern: Tachypneic Heart Rate: Pre procedure Pulse: 93 
         Post procedure Pulse: 92 Resp Rate: Pre procedure Respirations: 26 Post procedure Respirations: 26 
 
     
 
Oxygen: O2 Device: Nasal cannula   4L SpO2: Pre procedure SpO2: 92 % Post procedure SpO2: 94 % Nebulizer Therapy: Current medications Aerosolized Medications: DuoNeb Smoking History: Former Problem List:  
Patient Active Problem List  
Diagnosis Code  Pulmonary hypertension (HCC) I27.20  
 ASHD (arteriosclerotic heart disease) I25.10  Chronic airway obstruction, not elsewhere classified J44.9  Acute pancreatitis K85.90  
 HTN (hypertension) I10  
 COPD (chronic obstructive pulmonary disease) (HCC) J44.9  Chronic respiratory failure with hypoxia (HCC) J96.11  
 Hyperlipidemia E78.5  Right inguinal hernia K40.90  Pre-op testing Z01.818  
 COPD exacerbation ([facility] [address] J44.1 Respiratory Therapist: Judy Tapia V RT

## 2018-11-24 NOTE — ED NOTES
Hospitalist at bedside. Pt reports he has a lung doctor and was being evaluated for surgery to remove some parts of his lungs but was told he didn't need it but now it is too late to do surgery. He has 02 at home and wears as needed at night.

## 2018-11-24 NOTE — H&P
Hospitalist Admission NoteNAME: Mark Bingham :  1945 MRN:  [id number] Date/Time:  2018 11:02 AM 
 
Patient PCP: Laura Irizarry MD 
______________________________________________________________________ Given the patient's current clinical presentation, I have a high level of concern for decompensation if discharged from the emergency department. Complex decision making was performed, which includes reviewing the patient's available past medical records, laboratory results, and x-ray films. My assessment of this patient's clinical condition and my plan of care is as follows. Assessment / Plan: 
Copd exacerbation causing acute on chronic respiratory failure Secondary to pneumonia causing sepsis ( RR>20, wbc>12 with source) Baseline home oxygen 2 litres at night and as needed during the day History of primary pumonary hypertension ( cath in 2108 elevated LVEDP EF 55%) Cont supplemental oxygen, iv steroids, duonebs, and  
Ceftriaxone and zithromax CT chest reviewed : pneumonia , no PE 
PTA pt is is on trelegy inhaler ( combination of LABA, LAMA and steroid) Would continue advair and incruse here Pulmonologist is Dr. Leisa Ch ( consult pulmonary if need be) Hyponatremia: secondary to dehydration causing hypovolumia vs increased dose of hctz ) pt tells it was increased to 50 mg recently) 
- iv hydration for few hours , stop the hctz Hypertension Holding hctz as above, start on norvasc Pt did not tolerate lisinopril in the past secondary to cough Hydralazine prn, need to stream line BP medications before discharge Hypokalemia: replace Check labs in am 
 
History of tremors on primidone GERD/ hiatal hernia/ incidenatal exophagitis 
- iv protonix Needs GI eval  For endoscopy, pending the respiratory status Inpatient vs outpatient Dr. Caroline Pratt has seen in 2016 Speech eval to look for silent aspirations Code Status:DNR discussed with patient Pt and pt wife tells they have advance directives and DNR at home, encouraged to get them to hospital to scan to the computer. Surrogate Decision Maker: wife Jyoti Barron [id number] DVT Prophylaxis:scds ( given concern for hemoptysis, hold off on lovenox for now, monitor) GI Prophylaxis: protonix Baseline: independent, has been working up until few weeks ago Subjective: CHIEF COMPLAINT: cough HISTORY OF PRESENT ILLNESS:    
Mireille Britton is a 71 y.o. male who with history of COPD on home oxygen 2 litres at night and as needed during the day, follows with Dr. David Lindsey History of pulmonary hypertension, pt initially presented to PCP on Monday for the salivary gland enlargment, was given amox, during that visit, pt had exposed to sick contacts, and since Wednesday, started with cough and shortness of breath, low grade fevers Continue to get worse, with noted hemoptysis yesterday, and wheezing, presented to the ER Denies nausea, constipation, no urinary or bowel complaints We were asked to admit for work up and evaluation of the above problems. Past Medical History:  
Diagnosis Date  CAD (coronary artery disease)  Calculus of kidney  COPD   
 sees  dr. David Lindsey at [facility]  Hypertension  Ill-defined condition   
 oxygen as needed--2 liters  Ill-defined condition   
 tremors in thumb, rt  Inguinal hernia   
 right  Primary pulmonary HTN ([address] Past Surgical History:  
Procedure Laterality Date  CARDIAC SURG PROCEDURE UNLIST    
 heart cath.  CARDIAC SURG PROCEDURE UNLIST  2018  
 echo 50-55% EF  
 HX CATARACT REMOVAL    
 bilateral  
 HX HERNIA REPAIR    
 lt  HX OTHER SURGICAL    
 colonoscopy  HX TONSILLECTOMY Social History Tobacco Use  Smoking status: Former Smoker Packs/day: 1.00 Years: 44.00 Pack years: 44.00 Start date: 1961 Last attempt to quit: 2005 Years since quittin.4  Smokeless tobacco: Never Used Substance Use Topics  Alcohol use: No  
  
 
Family History Problem Relation Age of Onset  Heart Disease Brother Allergies Allergen Reactions  Codeine Other (comments) Keeps him awake Prior to Admission medications Medication Sig Start Date End Date Taking? Authorizing Provider  
pravastatin (PRAVACHOL) 40 mg tablet Take  by mouth. Provider, Historical  
hydroCHLOROthiazide (HYDRODIURIL) 12.5 mg tablet Take 12.5 mg by mouth daily. Provider, Historical  
GUAIFENESIN/DEXTROMETHORPHAN (MUCINEX DM PO) Take  by mouth two (2) times a day. Provider, Historical  
tiotropium (SPIRIVA) 18 mcg inhalation capsule Take 1 Cap by inhalation daily. Provider, Historical  
albuterol (PROVENTIL HFA, VENTOLIN HFA, PROAIR HFA) 90 mcg/actuation inhaler Take 1 Puff by inhalation every four (4) hours as needed for Wheezing. Provider, Historical  
primidone (MYSOLINE) 250 mg tablet Take 1,000 mg by mouth daily. Provider, Historical  
fluticasone-salmeterol (ADVAIR) 500-50 mcg/dose diskus inhaler Take 1 Puff by inhalation every twelve (12) hours. Other, MD Ladonna  
pravastatin (PRAVACHOL) 20 mg tablet Take 40 mg by mouth nightly. Provider, Historical  
montelukast (SINGULAIR) 10 mg tablet Take 10 mg by mouth daily. Provider, Historical  
 
 
REVIEW OF SYSTEMS:    
 
Total of 12 systems reviewed as follows:   
   POSITIVE= underlined text  Negative = text not underlined General:  fever, chills, sweats, generalized weakness, weight loss/gain,  
   loss of appetite Eyes:    blurred vision, eye pain, loss of vision, double vision ENT:    rhinorrhea, pharyngitis Respiratory:   cough, sputum production, SOB, RYAN, wheezing, pleuritic pain  
Cardiology:   chest pain, palpitations, orthopnea, PND, edema, syncope Gastrointestinal:  abdominal pain , N/V, diarrhea, dysphagia, constipation, bleeding Genitourinary:  frequency, urgency, dysuria, hematuria, incontinence Muskuloskeletal :  arthralgia, myalgia, back pain Hematology:  easy bruising, nose or gum bleeding, lymphadenopathy Dermatological: rash, ulceration, pruritis, color change / jaundice Endocrine:   hot flashes or polydipsia Neurological:  headache, dizziness, confusion, focal weakness, paresthesia, Speech difficulties, memory loss, gait difficulty Psychological: Feelings of anxiety, depression, agitation Objective: VITALS:   
Visit Vitals /81 Pulse 78 Temp 97.9 °F (36.6 °C) Resp 24 Ht 5' 2\" (1.575 m) Wt 59.7 kg (131 lb 9.8 oz) SpO2 96% BMI 24.07 kg/m² PHYSICAL EXAM: 
 
General:    Alert, cooperative, no distress, appears stated age. HEENT: Atraumatic, anicteric sclerae, pink conjunctivae No oral ulcers, mucosa moist, throat clear, dentition fair Neck:  Supple, symmetrical,  thyroid: non tender Lungs:   Bilateral decreased air entry, diffuse wheezing, no crackles, no accessory muscle use Chest wall:  No tenderness  No Accessory muscle use. Heart:   Regular  rhythm,  No  murmur   No edema Abdomen:   Soft, non-tender. Not distended. Bowel sounds normal 
Extremities: No cyanosis. No clubbing,   
  Skin turgor normal, Capillary refill normal, Radial dial pulse 2+ Skin:     Not pale. Not Jaundiced  No rashes Psych:  Good insight. Not depressed. Not anxious or agitated. Neurologic: EOMs intact. No facial asymmetry. No aphasia or slurred speech. Symmetrical strength, Sensation grossly intact. Alert and oriented X 4.  
 
_______________________________________________________________________ Care Plan discussed with: 
  Comments Patient y Family RN y   
Care Manager Consultant:     
_______________________________________________________________________ Expected  Disposition:  
Home with Family y HH/PT/OT/RN   
SNF/LTC   
[facility]   
 ________________________________________________________________________ TOTAL TIME:  65 Minutes Critical Care Provided     Minutes non procedure based Comments  
 y Reviewed previous records  
>50% of visit spent in counseling and coordination of care y Discussion with patient and/or family and questions answered 
  
 
________________________________________________________________________ Signed: Naresh Fairchild MD 
 
Procedures: see electronic medical records for all procedures/Xrays and details which were not copied into this note but were reviewed prior to creation of Plan. LAB DATA REVIEWED:   
Recent Results (from the past 24 hour(s)) EKG, 12 LEAD, INITIAL Collection Time: 11/24/18  9:12 AM  
Result Value Ref Range Ventricular Rate 79 BPM  
 Atrial Rate 79 BPM  
 P-R Interval 148 ms QRS Duration 82 ms Q-T Interval 370 ms QTC Calculation (Bezet) 424 ms Calculated P Axis 73 degrees Calculated R Axis -40 degrees Calculated T Axis 47 degrees Diagnosis Normal sinus rhythm Left axis deviation When compared with ECG of 12-APR-2016 02:03, No significant change was found POC CHEM8 Collection Time: 11/24/18  9:42 AM  
Result Value Ref Range Calcium, ionized (POC) 1.03 (L) 1.12 - 1.32 mmol/L Sodium (POC) 129 (L) 136 - 145 mmol/L Potassium (POC) 5.7 (H) 3.5 - 5.1 mmol/L Chloride (POC) 87 (L) 98 - 107 mmol/L  
 CO2 (POC) 37 (H) 21 - 32 mmol/L Anion gap (POC) 12 10 - 20 mmol/L Glucose (POC) 101 (H) 65 - 100 mg/dL BUN (POC) 9 9 - 20 mg/dL Creatinine (POC) 0.7 0.6 - 1.3 mg/dL GFRAA, POC >60 >60 ml/min/1.73m2 GFRNA, POC >60 >60 ml/min/1.73m2 Hematocrit (POC) 48 36.6 - 50.3 % Comment Comment Not Indicated. LACTIC ACID Collection Time: 11/24/18  9:44 AM  
Result Value Ref Range Lactic acid 1.4 0.4 - 2.0 MMOL/L  
CBC WITH AUTOMATED DIFF Collection Time: 11/24/18  9:44 AM  
Result Value Ref Range WBC 12.7 (H) 4.1 - 11.1 K/uL  
 RBC 5.56 4.10 - 5.70 M/uL  
 HGB 15.1 12.1 - 17.0 g/dL HCT 44.9 36.6 - 50.3 % MCV 80.8 80.0 - 99.0 FL  
 MCH 27.2 26.0 - 34.0 PG  
 MCHC 33.6 30.0 - 36.5 g/dL  
 RDW 17.8 (H) 11.5 - 14.5 % PLATELET 039 714 - 444 K/uL MPV 9.8 8.9 - 12.9 FL  
 NRBC 0.0 0  WBC ABSOLUTE NRBC 0.00 0.00 - 0.01 K/uL NEUTROPHILS 80 (H) 32 - 75 % LYMPHOCYTES 6 (L) 12 - 49 % MONOCYTES 11 5 - 13 % EOSINOPHILS 2 0 - 7 % BASOPHILS 0 0 - 1 % IMMATURE GRANULOCYTES 1 (H) 0.0 - 0.5 % ABS. NEUTROPHILS 10.1 (H) 1.8 - 8.0 K/UL  
 ABS. LYMPHOCYTES 0.8 0.8 - 3.5 K/UL  
 ABS. MONOCYTES 1.4 (H) 0.0 - 1.0 K/UL  
 ABS. EOSINOPHILS 0.3 0.0 - 0.4 K/UL  
 ABS. BASOPHILS 0.0 0.0 - 0.1 K/UL  
 ABS. IMM. GRANS. 0.1 (H) 0.00 - 0.04 K/UL  
 DF SMEAR SCANNED    
 RBC COMMENTS ANISOCYTOSIS 
1+ WBC COMMENTS TOXIC GRANULATION    
NT-PRO BNP Collection Time: 11/24/18  9:44 AM  
Result Value Ref Range NT pro-BNP 54 0 - 309 PG/ML  
METABOLIC PANEL, COMPREHENSIVE Collection Time: 11/24/18  9:44 AM  
Result Value Ref Range Sodium 129 (L) 136 - 145 mmol/L Potassium 3.3 (L) 3.5 - 5.1 mmol/L Chloride 89 (L) 97 - 108 mmol/L  
 CO2 35 (H) 21 - 32 mmol/L Anion gap 5 5 - 15 mmol/L Glucose 95 65 - 100 mg/dL BUN 8 6 - 20 MG/DL Creatinine 0.79 0.70 - 1.30 MG/DL  
 BUN/Creatinine ratio 10 (L) 12 - 20 GFR est AA >60 >60 ml/min/1.73m2 GFR est non-AA >60 >60 ml/min/1.73m2 Calcium 8.6 8.5 - 10.1 MG/DL Bilirubin, total 0.3 0.2 - 1.0 MG/DL  
 ALT (SGPT) 21 12 - 78 U/L  
 AST (SGOT) 15 15 - 37 U/L Alk. phosphatase 79 45 - 117 U/L Protein, total 8.5 (H) 6.4 - 8.2 g/dL Albumin 3.5 3.5 - 5.0 g/dL Globulin 5.0 (H) 2.0 - 4.0 g/dL A-G Ratio 0.7 (L) 1.1 - 2.2 SAMPLES BEING HELD Collection Time: 11/24/18  9:44 AM  
Result Value Ref Range SAMPLES BEING HELD 1BLUE   
 COMMENT   Add-on orders for these samples will be processed based on acceptable specimen integrity and analyte stability, which may vary by analyte. INFLUENZA A & B AG (RAPID TEST) Collection Time: 11/24/18 10:10 AM  
Result Value Ref Range Influenza A Antigen NEGATIVE  NEG  Influenza B Antigen NEGATIVE  NEG

## 2018-11-24 NOTE — ED PROVIDER NOTES
EMERGENCY DEPARTMENT HISTORY AND PHYSICAL EXAM 
 
 
Date: 11/24/2018 Patient Name: Mark Bingham History of Presenting Illness Chief Complaint Patient presents with  Shortness of Breath  
  sent by PCP to rule out pneumonia. states productive cough for a few days History Provided By: Patient HPI: Mark Bingham, 71 y.o. male with PMHx significant for HTN, COPD, and CAD, presents ambulatory to the ED with cc of persistent dry cough and SOB x 3 days, but worsening yesterday with an episode of bright red hemoptysis. Pt reports he was seen by his PCP recently for a stone in his salivary duct (currently on amoxicillin), however given SOB and cough he was referred to the ED for further evaluation. Upon ED arrival pt's oxygen saturation was 75% on RA; pt notes he had a home pulse ox reading of 64% last night. Pt reports his baseline oxygen saturation is 88-90%. Pt is oxygen dependent at night and PRN and also uses Trilogy in the morning, however notes he did not use it this morning pta. Pt reports prior tobacco use for 44 years, but has since quit. He denies prior hx of DVT/PE or recently leg pain/swelling. Pt specifically denies any chest pain, n/v/d, rash, weakness, hematochezia, or melena. There are no other complaints, changes, or physical findings at this time. PCP: Laura Irizarry MD 
 
Current Facility-Administered Medications Medication Dose Route Frequency Provider Last Rate Last Dose  sodium chloride (NS) flush 5-10 mL  5-10 mL IntraVENous PRN Deborra Flash, DO      
 sodium chloride 0.9 % bolus infusion 1,791 mL  30 mL/kg IntraVENous ONCE Deborra Flash, DO      
 sodium chloride (NS) flush 5-10 mL  5-10 mL IntraVENous Q8H Faiza Alexandra MD      
 sodium chloride (NS) flush 5-10 mL  5-10 mL IntraVENous PRN Rebekah Salinas MD      
 acetaminophen (TYLENOL) tablet 650 mg  650 mg Oral Q4H PRN Rebekah Salinas MD      
  ondansetron (ZOFRAN) injection 4 mg  4 mg IntraVENous Q4H PRN Miah Moreno MD      
 bisacodyl (DULCOLAX) tablet 5 mg  5 mg Oral DAILY PRN MD Leo PenningtonGrapes [START ON 11/25/2018] fluticasone-vilanterol (BREO ELLIPTA) 200mcg-25mcg/puff  1 Puff Inhalation DAILY Miah Moreno MD      
 montelukast (SINGULAIR) tablet 10 mg  10 mg Oral DAILY Miah Moreno MD      
 pravastatin (PRAVACHOL) tablet 40 mg  40 mg Oral QHS Miah Moreno MD      
 primidone (MYSOLINE) tablet 1,000 mg  1,000 mg Oral DAILY Miah Moreno MD      
 umeclidinium (INCRUSE ELLIPTA) 62.5 mcg/actuation  1 Puff Inhalation DAILY Miah Moreno MD      
 albuterol-ipratropium (DUO-NEB) 2.5 MG-0.5 MG/3 ML  3 mL Nebulization Q4H RT Miah Moreno MD   3 mL at 11/24/18 1247  
 [START ON 11/25/2018] cefTRIAXone (ROCEPHIN) 1 g in 0.9% sodium chloride (MBP/ADV) 50 mL  1 g IntraVENous Q24H Miah Moreno MD      
 hydrALAZINE (APRESOLINE) 20 mg/mL injection 10 mg  10 mg IntraVENous Q6H PRN MD Shana Pennington.Grapes [START ON 11/25/2018] amLODIPine (NORVASC) tablet 5 mg  5 mg Oral DAILY Miah Moreno MD      
 0.9% sodium chloride infusion  75 mL/hr IntraVENous CONTINUOUS MD Shana Pennington.Grapes [START ON 11/25/2018] pantoprazole (PROTONIX) 40 mg in sodium chloride 0.9% 10 mL injection  40 mg IntraVENous DAILY MD Shana Pennington.Grapes [START ON 11/25/2018] azithromycin (ZITHROMAX) 500 mg in 0.9% sodium chloride 250 mL ([id number])  500 mg IntraVENous Q24H Miah Moreno MD      
 methylPREDNISolone (PF) (SOLU-MEDROL) injection 60 mg  60 mg IntraVENous Q6H Miah Moreno MD      
 potassium chloride (K-DUR, KLOR-CON) SR tablet 20 mEq  20 mEq Oral NOW Miah Moreno MD      
 
Current Outpatient Medications Medication Sig Dispense Refill  pravastatin (PRAVACHOL) 40 mg tablet Take  by mouth.     
 hydroCHLOROthiazide (HYDRODIURIL) 12.5 mg tablet Take 12.5 mg by mouth daily.    
 GUAIFENESIN/DEXTROMETHORPHAN (MUCINEX DM PO) Take  by mouth two (2) times a day.  tiotropium (SPIRIVA) 18 mcg inhalation capsule Take 1 Cap by inhalation daily.  albuterol (PROVENTIL HFA, VENTOLIN HFA, PROAIR HFA) 90 mcg/actuation inhaler Take 1 Puff by inhalation every four (4) hours as needed for Wheezing.  primidone (MYSOLINE) 250 mg tablet Take 1,000 mg by mouth daily.  fluticasone-salmeterol (ADVAIR) 500-50 mcg/dose diskus inhaler Take 1 Puff by inhalation every twelve (12) hours.  pravastatin (PRAVACHOL) 20 mg tablet Take 40 mg by mouth nightly.  montelukast (SINGULAIR) 10 mg tablet Take 10 mg by mouth daily. Past History Past Medical History: 
Past Medical History:  
Diagnosis Date  CAD (coronary artery disease)  Calculus of kidney  COPD   
 sees  dr. Arsenio Clayton at [facility]  Hypertension  Ill-defined condition   
 oxygen as needed--2 liters  Ill-defined condition   
 tremors in thumb, rt  Inguinal hernia   
 right  Primary pulmonary HTN ([address] Past Surgical History: 
Past Surgical History:  
Procedure Laterality Date  CARDIAC SURG PROCEDURE UNLIST    
 heart cath.  CARDIAC SURG PROCEDURE UNLIST  2018  
 echo 50-55% EF  
 HX CATARACT REMOVAL    
 bilateral  
 HX HERNIA REPAIR    
 lt  HX OTHER SURGICAL    
 colonoscopy  HX TONSILLECTOMY Family History: 
Family History Problem Relation Age of Onset  Heart Disease Brother Social History: 
Social History Tobacco Use  Smoking status: Former Smoker Packs/day: 1.00 Years: 44.00 Pack years: 44.00 Start date: 1961 Last attempt to quit: 2005 Years since quittin.4  Smokeless tobacco: Never Used Substance Use Topics  Alcohol use: No  
 Drug use: No  
 
 
Allergies: Allergies Allergen Reactions  Codeine Other (comments) Keeps him awake Review of Systems Review of Systems Constitutional: Negative for chills and fever. HENT: Negative for congestion and sore throat. Eyes: Negative for visual disturbance. Respiratory: Positive for cough and shortness of breath. Cardiovascular: Negative for chest pain and leg swelling. Gastrointestinal: Negative for abdominal pain, blood in stool, diarrhea, nausea and vomiting.  
     - melena Endocrine: Negative for polyuria. Genitourinary: Negative for dysuria and testicular pain. Musculoskeletal: Negative for arthralgias, joint swelling and myalgias. Skin: Negative for rash. Allergic/Immunologic: Negative for immunocompromised state. Neurological: Negative for weakness and headaches. Hematological: Does not bruise/bleed easily. Psychiatric/Behavioral: Negative for confusion. Physical Exam  
Physical Exam  
Constitutional: He is oriented to person, place, and time. He appears well-developed and well-nourished. HENT:  
Head: Normocephalic and atraumatic. Moist mucous membranes Eyes: Conjunctivae are normal. Pupils are equal, round, and reactive to light. Right eye exhibits no discharge. Left eye exhibits no discharge. Neck: Normal range of motion. Neck supple. No tracheal deviation present. Cardiovascular: Normal rate, regular rhythm and normal heart sounds. No murmur heard. Pulmonary/Chest: Effort normal. No respiratory distress. He has wheezes (b/l). He has no rales. Decreased air movement. No increased work of breathing. Abdominal: Soft. Bowel sounds are normal. There is no tenderness. There is no rebound and no guarding. Musculoskeletal: Normal range of motion. He exhibits no edema, tenderness or deformity. Neurological: He is alert and oriented to person, place, and time. Skin: Skin is warm and dry. No rash noted. No cyanosis or erythema. Psychiatric: His behavior is normal.  
Nursing note and vitals reviewed. Diagnostic Study Results Labs - 
  
 Recent Results (from the past 12 hour(s)) EKG, 12 LEAD, INITIAL Collection Time: 11/24/18  9:12 AM  
Result Value Ref Range Ventricular Rate 79 BPM  
 Atrial Rate 79 BPM  
 P-R Interval 148 ms QRS Duration 82 ms Q-T Interval 370 ms QTC Calculation (Bezet) 424 ms Calculated P Axis 73 degrees Calculated R Axis -40 degrees Calculated T Axis 47 degrees Diagnosis Normal sinus rhythm Left axis deviation When compared with ECG of 12-APR-2016 02:03, No significant change was found POC CHEM8 Collection Time: 11/24/18  9:42 AM  
Result Value Ref Range Calcium, ionized (POC) 1.03 (L) 1.12 - 1.32 mmol/L Sodium (POC) 129 (L) 136 - 145 mmol/L Potassium (POC) 5.7 (H) 3.5 - 5.1 mmol/L Chloride (POC) 87 (L) 98 - 107 mmol/L  
 CO2 (POC) 37 (H) 21 - 32 mmol/L Anion gap (POC) 12 10 - 20 mmol/L Glucose (POC) 101 (H) 65 - 100 mg/dL BUN (POC) 9 9 - 20 mg/dL Creatinine (POC) 0.7 0.6 - 1.3 mg/dL GFRAA, POC >60 >60 ml/min/1.73m2 GFRNA, POC >60 >60 ml/min/1.73m2 Hematocrit (POC) 48 36.6 - 50.3 % Comment Comment Not Indicated. LACTIC ACID Collection Time: 11/24/18  9:44 AM  
Result Value Ref Range Lactic acid 1.4 0.4 - 2.0 MMOL/L  
CBC WITH AUTOMATED DIFF Collection Time: 11/24/18  9:44 AM  
Result Value Ref Range WBC 12.7 (H) 4.1 - 11.1 K/uL  
 RBC 5.56 4.10 - 5.70 M/uL  
 HGB 15.1 12.1 - 17.0 g/dL HCT 44.9 36.6 - 50.3 % MCV 80.8 80.0 - 99.0 FL  
 MCH 27.2 26.0 - 34.0 PG  
 MCHC 33.6 30.0 - 36.5 g/dL  
 RDW 17.8 (H) 11.5 - 14.5 % PLATELET 607 862 - 238 K/uL MPV 9.8 8.9 - 12.9 FL  
 NRBC 0.0 0  WBC ABSOLUTE NRBC 0.00 0.00 - 0.01 K/uL NEUTROPHILS 80 (H) 32 - 75 % LYMPHOCYTES 6 (L) 12 - 49 % MONOCYTES 11 5 - 13 % EOSINOPHILS 2 0 - 7 % BASOPHILS 0 0 - 1 % IMMATURE GRANULOCYTES 1 (H) 0.0 - 0.5 % ABS. NEUTROPHILS 10.1 (H) 1.8 - 8.0 K/UL  
 ABS. LYMPHOCYTES 0.8 0.8 - 3.5 K/UL ABS. MONOCYTES 1.4 (H) 0.0 - 1.0 K/UL  
 ABS. EOSINOPHILS 0.3 0.0 - 0.4 K/UL  
 ABS. BASOPHILS 0.0 0.0 - 0.1 K/UL  
 ABS. IMM. GRANS. 0.1 (H) 0.00 - 0.04 K/UL  
 DF SMEAR SCANNED    
 RBC COMMENTS ANISOCYTOSIS 
1+ WBC COMMENTS TOXIC GRANULATION    
NT-PRO BNP Collection Time: 11/24/18  9:44 AM  
Result Value Ref Range NT pro-BNP 54 0 - 975 PG/ML  
METABOLIC PANEL, COMPREHENSIVE Collection Time: 11/24/18  9:44 AM  
Result Value Ref Range Sodium 129 (L) 136 - 145 mmol/L Potassium 3.3 (L) 3.5 - 5.1 mmol/L Chloride 89 (L) 97 - 108 mmol/L  
 CO2 35 (H) 21 - 32 mmol/L Anion gap 5 5 - 15 mmol/L Glucose 95 65 - 100 mg/dL BUN 8 6 - 20 MG/DL Creatinine 0.79 0.70 - 1.30 MG/DL  
 BUN/Creatinine ratio 10 (L) 12 - 20 GFR est AA >60 >60 ml/min/1.73m2 GFR est non-AA >60 >60 ml/min/1.73m2 Calcium 8.6 8.5 - 10.1 MG/DL Bilirubin, total 0.3 0.2 - 1.0 MG/DL  
 ALT (SGPT) 21 12 - 78 U/L  
 AST (SGOT) 15 15 - 37 U/L Alk. phosphatase 79 45 - 117 U/L Protein, total 8.5 (H) 6.4 - 8.2 g/dL Albumin 3.5 3.5 - 5.0 g/dL Globulin 5.0 (H) 2.0 - 4.0 g/dL A-G Ratio 0.7 (L) 1.1 - 2.2 SAMPLES BEING HELD Collection Time: 11/24/18  9:44 AM  
Result Value Ref Range SAMPLES BEING HELD 1BLUE   
 COMMENT Add-on orders for these samples will be processed based on acceptable specimen integrity and analyte stability, which may vary by analyte. INFLUENZA A & B AG (RAPID TEST) Collection Time: 11/24/18 10:10 AM  
Result Value Ref Range Influenza A Antigen NEGATIVE  NEG Influenza B Antigen NEGATIVE  NEG    
URINALYSIS W/ REFLEX CULTURE Collection Time: 11/24/18 11:49 AM  
Result Value Ref Range Color YELLOW/STRAW Appearance CLEAR CLEAR Specific gravity 1.020 1.003 - 1.030    
 pH (UA) 7.0 5.0 - 8.0 Protein NEGATIVE  NEG mg/dL Glucose NEGATIVE  NEG mg/dL Ketone NEGATIVE  NEG mg/dL Bilirubin NEGATIVE  NEG  Blood SMALL (A) NEG    
 Urobilinogen 0.2 0.2 - 1.0 EU/dL Nitrites NEGATIVE  NEG Leukocyte Esterase NEGATIVE  NEG    
 WBC 0-4 0 - 4 /hpf  
 RBC 0-5 0 - 5 /hpf Epithelial cells FEW FEW /lpf Bacteria NEGATIVE  NEG /hpf  
 UA:UC IF INDICATED CULTURE NOT INDICATED BY UA RESULT CNI Hyaline cast 0-2 0 - 5 /lpf Radiologic Studies -  
CT Results  (Last 48 hours)  
          
 11/24/18 1029  CTA CHEST W OR W WO CONT Final result Impression:  IMPRESSION:   
1. No pulmonary embolus. 2. Emphysema. 3. Right lower lobe airspace disease. 4. Hiatal hernia with thickened esophagus suggesting esophagitis. Narrative:  EXAM: CT ANGIOGRAPHY CHEST INDICATION: Chest pain, acute, pulmonary embolism (PE) suspected; cough,  
hemoptysis. COMPARISON: 7/20/2018. CONTRAST: 100 mL of Isovue-370. TECHNIQUE:   
Precontrast  images were obtained to localize the volume for acquisition. Multislice helical CT arteriography was performed from the diaphragm to the  
thoracic inlet during uneventful rapid bolus intravenous contrast  
administration. Lung and soft tissue windows were generated. Coronal and  
sagittal  reformatted images were also generated and 3D post processing  
consisting of coronal maximum intensity projection images was performed. CT dose  
reduction was achieved through use of a standardized protocol tailored for this  
examination and automatic exposure control for dose modulation. FINDINGS:  
LOWER NECK: The visualized portions of the thyroid and structures of the lower  
neck are within normal limits. LUNGS: There are changes of emphysema throughout the lungs with airspace disease  
in the right lower lobe. PLEURA: There is no pleural effusion or pneumothorax. PULMONARY ARTERIES: The pulmonary arteries are well enhanced and no pulmonary  
emboli are identified. AORTA: The aorta enhances normally without evidence of aneurysm or dissection. MEDIASTINUM: There is no mediastinal or hilar adenopathy or mass. Is a hiatal  
hernia in the distal esophagus is thickened. BONES AND SOFT TISSUES: The bones and soft tissues of the chest wall are within  
normal limits. UPPER ABDOMEN: The visualized portions of the upper abdominal organs are normal.  
   
  
  
 
CXR Results  (Last 48 hours)  
          
 11/24/18 0928  XR CHEST PORT Final result Impression:  IMPRESSION:   
1. Heterogeneous opacities at the right lung base, which may represent  
atelectasis or infection in the appropriate clinical context. 2. Chronic emphysematous changes. Narrative:  EXAM:  XR CHEST PORT INDICATION:   meets SIRS criteria COMPARISON: Chest radiograph 1/23/2018. FINDINGS: AP radiograph of the chest was obtained. Chronic emphysematous changes. Heterogeneous opacities at the right lung base. No pleural effusion or pneumothorax. Heart, annette, mediastinum are within normal  
limits. No acute osseous abnormalities. Medical Decision Making I am the first provider for this patient. I reviewed the vital signs, available nursing notes, past medical history, past surgical history, family history and social history. Vital Signs-Reviewed the patient's vital signs. Patient Vitals for the past 12 hrs: 
 Temp Pulse Resp BP SpO2  
11/24/18 1115  79 22 142/83 93 % 11/24/18 1015  78 24 136/81 96 % 11/24/18 1000  79 24 138/77 95 % 11/24/18 0949  80 21  96 % 11/24/18 0937  80 19  95 % 11/24/18 0929  81 23  95 % 11/24/18 0922  79 24  96 % 11/24/18 0903     93 % 11/24/18 0859     (!) 78 % 11/24/18 0849 97.9 °F (36.6 °C) 93 20 166/84 (!) 75 % Pulse Oximetry Analysis - 93% on 4L Cardiac Monitor:  
Rate: 93 bpm 
Rhythm: Normal Sinus Rhythm EKG interpretation: (Preliminary) 0581 Rhythm: normal sinus rhythm; and regular . Rate (approx.): 79;  Axis: normal; QRS interval: 82; QT/QTc: 370/424; ST/T wave: normal; Other findings: no acute ischemic changes. Written by JOSE ALBERTO Gamboa, as dictated by [employer]DO 
 
Records Reviewed: Nursing Notes, Old Medical Records, Previous electrocardiograms, Previous Radiology Studies and Previous Laboratory Studies Provider Notes (Medical Decision Making): DDx: PNA, COPD, PE, CHF 
 
ED Course:  
Initial assessment performed. The patients presenting problems have been discussed, and they are in agreement with the care plan formulated and outlined with them. I have encouraged them to ask questions as they arise throughout their visit. PROGRESS NOTE: 
10:12 AM 
Pt resting comfortably. No respiratory distress. Updated on results thus far. Presumptive dx PNA. Written by JOSE ALBERTO Gamboa, as dictated by [employer]DO 
 
CONSULT NOTE:  
10:29 AM 
Bio[employer] Technology SolutionsDO spoke with Omaira Tee MD 
Specialty: Hospitalist 
Discussed pt's hx, disposition, and available diagnostic and imaging results. Reviewed care plans. Consultant will evaluate pt for admission. Written by JOSE ALBERTO Gamboa, as dictated by [employer]DO 
 
CRITICAL CARE NOTE : 
 
9:00 AM 
 
IMPENDING DETERIORATION -Respiratory and Metabolic ASSOCIATED RISK FACTORS - Hypoxia and Metabolic changes MANAGEMENT- Bedside Assessment and Supervision of Care INTERPRETATION -  Xrays, CT Scan, ECG and Blood Pressure INTERVENTIONS - Metobolic interventions and oxygen administration, IVF at 999 ml/hr CASE REVIEW - Hospitalist, Medical Sub-Specialist and Nursing TREATMENT RESPONSE -Improved PERFORMED BY - Self NOTES   : 
 
I have spent 61 minutes of critical care time involved in lab review, consultations with specialist, family decision- making, bedside attention and documentation. During this entire length of time I was immediately available to the patient . [employer]DO Disposition: 
ADMIT NOTE: 
10:29 AM 
 The patient is being admitted to the hospital by Ana Strong MD.  The results of their tests and reasons for their admission have been discussed with the patient and/or available family. They convey agreement and understanding for the need to be admitted and for their admission diagnosis. PLAN: 
1. Admit to hospitalist  
 
Diagnosis Clinical Impression: 1. Pneumonia due to infectious organism, unspecified laterality, unspecified part of lung 2. Acute on chronic respiratory failure with hypoxia (HCC) 3. Severe sepsis ([address] Attestations: This note is prepared by Analy Hills, acting as Scribe for [employer], DO. [employer], DO: The scribe's documentation has been prepared under my direction and personally reviewed by me in its entirety. I confirm that the note above accurately reflects all work, treatment, procedures, and medical decision making performed by me.

## 2018-11-25 LAB
ANION GAP SERPL CALC-SCNC: 6 MMOL/L (ref 5–15)
ATRIAL RATE: 79 BPM
BUN SERPL-MCNC: 10 MG/DL (ref 6–20)
BUN/CREAT SERPL: 15 (ref 12–20)
CALCIUM SERPL-MCNC: 8.1 MG/DL (ref 8.5–10.1)
CALCULATED P AXIS, ECG09: 73 DEGREES
CALCULATED R AXIS, ECG10: -40 DEGREES
CALCULATED T AXIS, ECG11: 47 DEGREES
CHLORIDE SERPL-SCNC: 98 MMOL/L (ref 97–108)
CO2 SERPL-SCNC: 30 MMOL/L (ref 21–32)
CREAT SERPL-MCNC: 0.67 MG/DL (ref 0.7–1.3)
DIAGNOSIS, 93000: NORMAL
ERYTHROCYTE [DISTWIDTH] IN BLOOD BY AUTOMATED COUNT: 17.1 % (ref 11.5–14.5)
GLUCOSE SERPL-MCNC: 119 MG/DL (ref 65–100)
HCT VFR BLD AUTO: 39.1 % (ref 36.6–50.3)
HGB BLD-MCNC: 12.9 G/DL (ref 12.1–17)
MCH RBC QN AUTO: 26.9 PG (ref 26–34)
MCHC RBC AUTO-ENTMCNC: 33 G/DL (ref 30–36.5)
MCV RBC AUTO: 81.6 FL (ref 80–99)
NRBC # BLD: 0 K/UL (ref 0–0.01)
NRBC BLD-RTO: 0 PER 100 WBC
P-R INTERVAL, ECG05: 148 MS
PLATELET # BLD AUTO: 298 K/UL (ref 150–400)
PMV BLD AUTO: 9.8 FL (ref 8.9–12.9)
POTASSIUM SERPL-SCNC: 3.5 MMOL/L (ref 3.5–5.1)
Q-T INTERVAL, ECG07: 370 MS
QRS DURATION, ECG06: 82 MS
QTC CALCULATION (BEZET), ECG08: 424 MS
RBC # BLD AUTO: 4.79 M/UL (ref 4.1–5.7)
SODIUM SERPL-SCNC: 134 MMOL/L (ref 136–145)
VENTRICULAR RATE, ECG03: 79 BPM
WBC # BLD AUTO: 11.2 K/UL (ref 4.1–11.1)

## 2018-11-25 PROCEDURE — 36415 COLL VENOUS BLD VENIPUNCTURE: CPT

## 2018-11-25 PROCEDURE — 94640 AIRWAY INHALATION TREATMENT: CPT

## 2018-11-25 PROCEDURE — 94760 N-INVAS EAR/PLS OXIMETRY 1: CPT

## 2018-11-25 PROCEDURE — 74011250636 HC RX REV CODE- 250/636: Performed by: HOSPITALIST

## 2018-11-25 PROCEDURE — 74011250637 HC RX REV CODE- 250/637: Performed by: HOSPITALIST

## 2018-11-25 PROCEDURE — C9113 INJ PANTOPRAZOLE SODIUM, VIA: HCPCS | Performed by: HOSPITALIST

## 2018-11-25 PROCEDURE — 77010033678 HC OXYGEN DAILY

## 2018-11-25 PROCEDURE — 74011000250 HC RX REV CODE- 250: Performed by: HOSPITALIST

## 2018-11-25 PROCEDURE — G8979 MOBILITY GOAL STATUS: HCPCS | Performed by: PHYSICAL THERAPIST

## 2018-11-25 PROCEDURE — 74011000258 HC RX REV CODE- 258: Performed by: HOSPITALIST

## 2018-11-25 PROCEDURE — G8980 MOBILITY D/C STATUS: HCPCS | Performed by: PHYSICAL THERAPIST

## 2018-11-25 PROCEDURE — 65660000000 HC RM CCU STEPDOWN

## 2018-11-25 PROCEDURE — G8978 MOBILITY CURRENT STATUS: HCPCS | Performed by: PHYSICAL THERAPIST

## 2018-11-25 PROCEDURE — 97161 PT EVAL LOW COMPLEX 20 MIN: CPT | Performed by: PHYSICAL THERAPIST

## 2018-11-25 PROCEDURE — 85027 COMPLETE CBC AUTOMATED: CPT

## 2018-11-25 PROCEDURE — 80048 BASIC METABOLIC PNL TOTAL CA: CPT

## 2018-11-25 PROCEDURE — 97116 GAIT TRAINING THERAPY: CPT | Performed by: PHYSICAL THERAPIST

## 2018-11-25 RX ORDER — SODIUM CHLORIDE 9 MG/ML
75 INJECTION, SOLUTION INTRAVENOUS CONTINUOUS
Status: DISCONTINUED | OUTPATIENT
Start: 2018-11-25 | End: 2018-11-27

## 2018-11-25 RX ORDER — IPRATROPIUM BROMIDE AND ALBUTEROL SULFATE 2.5; .5 MG/3ML; MG/3ML
3 SOLUTION RESPIRATORY (INHALATION)
Status: DISCONTINUED | OUTPATIENT
Start: 2018-11-26 | End: 2018-11-26

## 2018-11-25 RX ADMIN — METHYLPREDNISOLONE SODIUM SUCCINATE 60 MG: 125 INJECTION, POWDER, FOR SOLUTION INTRAMUSCULAR; INTRAVENOUS at 05:01

## 2018-11-25 RX ADMIN — IPRATROPIUM BROMIDE AND ALBUTEROL SULFATE 3 ML: .5; 3 SOLUTION RESPIRATORY (INHALATION) at 07:49

## 2018-11-25 RX ADMIN — METHYLPREDNISOLONE SODIUM SUCCINATE 60 MG: 125 INJECTION, POWDER, FOR SOLUTION INTRAMUSCULAR; INTRAVENOUS at 10:44

## 2018-11-25 RX ADMIN — SODIUM CHLORIDE 40 MG: 9 INJECTION, SOLUTION INTRAMUSCULAR; INTRAVENOUS; SUBCUTANEOUS at 08:43

## 2018-11-25 RX ADMIN — IPRATROPIUM BROMIDE AND ALBUTEROL SULFATE 3 ML: .5; 3 SOLUTION RESPIRATORY (INHALATION) at 04:20

## 2018-11-25 RX ADMIN — PRIMIDONE 1000 MG: 250 TABLET ORAL at 08:43

## 2018-11-25 RX ADMIN — Medication 10 ML: at 20:53

## 2018-11-25 RX ADMIN — Medication 10 ML: at 05:04

## 2018-11-25 RX ADMIN — IPRATROPIUM BROMIDE AND ALBUTEROL SULFATE 3 ML: .5; 3 SOLUTION RESPIRATORY (INHALATION) at 11:56

## 2018-11-25 RX ADMIN — METHYLPREDNISOLONE SODIUM SUCCINATE 60 MG: 125 INJECTION, POWDER, FOR SOLUTION INTRAMUSCULAR; INTRAVENOUS at 22:00

## 2018-11-25 RX ADMIN — Medication 10 ML: at 10:50

## 2018-11-25 RX ADMIN — IPRATROPIUM BROMIDE AND ALBUTEROL SULFATE 3 ML: .5; 3 SOLUTION RESPIRATORY (INHALATION) at 20:04

## 2018-11-25 RX ADMIN — METHYLPREDNISOLONE SODIUM SUCCINATE 60 MG: 125 INJECTION, POWDER, FOR SOLUTION INTRAMUSCULAR; INTRAVENOUS at 17:54

## 2018-11-25 RX ADMIN — PRAVASTATIN SODIUM 40 MG: 40 TABLET ORAL at 22:00

## 2018-11-25 RX ADMIN — AZITHROMYCIN MONOHYDRATE 500 MG: 500 INJECTION, POWDER, LYOPHILIZED, FOR SOLUTION INTRAVENOUS at 12:21

## 2018-11-25 RX ADMIN — CEFTRIAXONE 1 G: 1 INJECTION, POWDER, FOR SOLUTION INTRAMUSCULAR; INTRAVENOUS at 10:40

## 2018-11-25 RX ADMIN — SODIUM CHLORIDE 75 ML/HR: 900 INJECTION, SOLUTION INTRAVENOUS at 10:37

## 2018-11-25 RX ADMIN — AMLODIPINE BESYLATE 5 MG: 5 TABLET ORAL at 08:43

## 2018-11-25 RX ADMIN — SODIUM CHLORIDE 75 ML/HR: 900 INJECTION, SOLUTION INTRAVENOUS at 20:48

## 2018-11-25 RX ADMIN — MONTELUKAST SODIUM 10 MG: 10 TABLET, COATED ORAL at 08:43

## 2018-11-25 RX ADMIN — Medication 10 ML: at 10:49

## 2018-11-25 RX ADMIN — IPRATROPIUM BROMIDE AND ALBUTEROL SULFATE 3 ML: .5; 3 SOLUTION RESPIRATORY (INHALATION) at 00:03

## 2018-11-25 RX ADMIN — IPRATROPIUM BROMIDE AND ALBUTEROL SULFATE 3 ML: .5; 3 SOLUTION RESPIRATORY (INHALATION) at 15:38

## 2018-11-25 RX ADMIN — UMECLIDINIUM 1 PUFF: 62.5 AEROSOL, POWDER ORAL at 08:45

## 2018-11-25 NOTE — PROGRESS NOTES
[phone] Call placed to Dr. Vania Chand re:  Pt wishes to continue his \"Trelegy\"  Inhaler from home, instead of the Breo ellipta nd the Incruse ellipta here,  Wife could bring it in.  
12  Dr. Vania Chand agreed to let patient use own Trelegy inhaler, wife aware to bring from home and have Pharmacy verify it before using.

## 2018-11-25 NOTE — PROGRESS NOTES
Primary Nurse Debbie Louise, JESSIKA and Thierry Patient , RN, RN performed a dual skin assessment on this patient Impairment noted- see wound doc flow sheet Billy score is 20 Right arm with ecchymosis noted to forearm

## 2018-11-25 NOTE — PROGRESS NOTES
Hospitalist Progress Note NAME: Kelly La :  1945 MRN:  [id number] Assessment / Plan: 
Acute on chronic hypoxic respiratory failure due to acute COPD exacerbation due to CAP, POA Sepsis POA resolved Baseline home O2 2 L at night History of primary pumonary hypertension  
-clinically: feeling a little bit better; leukocytosis is down  
-cont aggressive IV steroids and jet nebs  
-AB: empiric CTX + zithromax  
-PTA pt is is on trelegy inhaler ( combination of LABA, LAMA and steroid) Would continue advair and incruse here  
-O2 to keep rosie >90%, wean as tolerated back to home level  
-CTA:1. No PE 2. Emphysema. 3. Right lower lobe airspace disease. 
-primary pulmonologist: Dr Gautam Woodruff Hyponatremia 
-better 129--> 134, will give gentle hydration overnight Holding hctz HTN  
-BP stable 
-holding hctz for hyponatremia GERD/ hiatal hernia/ incidenatal esophagitis 
-accidental finding by CT 
- iv Protonix 
-speech to assess fro silent aspiration 
-will consult GI on Monday. Has seen Dr. Trujillo [place] in 2016 
-CT: Hiatal hernia with thickened esophagus suggesting esophagitis. Essential tremors, on primidone Hypokalemia Hyperlipidemia, cont statin Code status: DNR Surrogate Decision Maker: wife Danelle Coop [id number] DVT Prophylaxis:scds ( given concern for hemoptysis, hold off on lovenox for now, monitor) Baseline: , lives with wife, independent Recommended Disposition: Home w/Family Subjective: Chief Complaint / Reason for Physician Visit: following copd exacerbation / pneumonia / hyponatremia On 4L O2 overnight \" feeling a little bit better\" Discussed with RN events overnight. Review of Systems: 
Symptom Y/N Comments  Symptom Y/N Comments Fever/Chills    Chest Pain Poor Appetite    Edema Cough y   Abdominal Pain Sputum    Joint Pain SOB/RYAN y   Pruritis/Rash Nausea/vomit    Tolerating PT/OT Diarrhea    Tolerating Diet Constipation    Other Could NOT obtain due to:   
 
Objective: VITALS:  
Last 24hrs VS reviewed since prior progress note. Most recent are: 
Patient Vitals for the past 24 hrs: 
 Temp Pulse Resp BP SpO2  
11/25/18 0749     92 % 11/25/18 0736 97.9 °F (36.6 °C) 80 20 134/81 96 % 11/25/18 0450 97.9 °F (36.6 °C) 79 18 119/62 96 % 11/25/18 0419     94 % 11/25/18 0009 97.7 °F (36.5 °C) 80 18 126/69 97 % 11/25/18 0001     95 % 11/24/18 2248 97.5 °F (36.4 °C) 86 18 137/68 91 % 11/24/18 2024 98.2 °F (36.8 °C) 90 18 128/67 91 % 11/24/18 1952     93 % 11/24/18 1512 97.9 °F (36.6 °C) 90 20 141/78 94 % 11/24/18 1511     92 % 11/24/18 1357  96 21  94 % 11/24/18 1346 98.6 °F (37 °C) 96 23 128/72 95 % 11/24/18 1342  93     
11/24/18 1336  99 20 144/77 (!) 89 % 11/24/18 1315  84 20 128/82 96 % 11/24/18 1115  79 22 142/83 93 % 11/24/18 1015  78 24 136/81 96 % 11/24/18 1000  79 24 138/77 95 % 11/24/18 0949  80 21  96 % Intake/Output Summary (Last 24 hours) at 11/25/2018 5531 Last data filed at 11/24/2018 5464 Gross per 24 hour Intake 1674.17 ml Output 1000 ml Net 674.17 ml PHYSICAL EXAM: 
General: WD, WN. Alert, cooperative, no acute distress, mild dyspnea with conversation  
EENT:  EOMI. Anicteric sclerae. MMM Resp:  decreased air entry bilaterally, + end exp wheezing, no rales. No accessory muscle use CV:  Regular  rhythm,  No edema GI:  Soft, Non distended, Non tender.  +Bowel sounds Neurologic:  Alert and oriented X 3, normal speech, Psych:   Good insight. Not anxious nor agitated Skin:  No rashes. No jaundice Reviewed most current lab test results and cultures  YES Reviewed most current radiology test results   YES Review and summation of old records today    NO Reviewed patient's current orders and MAR    YES 
PMH/SH reviewed - no change compared to H&P 
 ________________________________________________________________________ Care Plan discussed with: 
  Comments Patient y Family RN y   
Care Manager Consultant Multidiciplinary team rounds were held today with , nursing, pharmacist and clinical coordinator. Patient's plan of care was discussed; medications were reviewed and discharge planning was addressed. ________________________________________________________________________ Total NON critical care TIME:  35   Minutes Total CRITICAL CARE TIME Spent:   Minutes non procedure based Comments >50% of visit spent in counseling and coordination of care    
________________________________________________________________________ Alexus Newman MD  
 
Procedures: see electronic medical records for all procedures/Xrays and details which were not copied into this note but were reviewed prior to creation of Plan. LABS: 
I reviewed today's most current labs and imaging studies. Pertinent labs include: 
Recent Labs  
  11/25/18 
0508 11/24/18 
0944 WBC 11.2* 12.7* HGB 12.9 15.1 HCT 39.1 44.9  299 Recent Labs  
  11/25/18 
0508 11/24/18 
0944 * 129*  
K 3.5 3.3*  
CL 98 89* CO2 30 35* * 95 BUN 10 8 CREA 0.67* 0.79 CA 8.1* 8.6 ALB  --  3.5 TBILI  --  0.3 SGOT  --  15 ALT  --  21 Signed: Alexus Newman MD

## 2018-11-25 NOTE — PROGRESS NOTES
Bedside and Verbal shift change report given to Tracy Rodríguez RN (oncoming nurse) by Sachin Marcelo RN (offgoing nurse). Report included the following information SBAR, Kardex, ED Summary, [facility] and Recent Results.

## 2018-11-25 NOTE — PROGRESS NOTES
General Surgery End of Shift Nursing Note Bedside shift change report given to [facility] RN (oncoming nurse) by [id number] (offgoing nurse). Report included the following information SBAR, Kardex, Intake/Output, MAR, Recent Results and Cardiac Rhythm SR. Shift worked:   7p-7a Summary of shift:    Expiratory wheezing heard, nonproductive cough, VSS, still on 4L O2 via n/c Issues for physician to address:   none Number times ambulated in hallway past shift: 0 Number of times OOB to chair past shift: 0 Pain Management: 
Current medication: none Patient states pain is manageable on current pain medication: YES 
 
GI: 
 
Current diet:  No diet orders on file Tolerating current diet: YES Passing flatus: YES Last Bowel Movement:  
 
 
Respiratory: 
 
Incentive Spirometer at bedside: NO 
Patient instructed on use: NO 
 
Patient Safety: 
 
Falls Score: 1 Bed Alarm On? No 
Sitter?  No 
 
Sirisha JESSIKA Allen

## 2018-11-25 NOTE — PROGRESS NOTES
Problem: Mobility Impaired (Adult and Pediatric) Goal: *Acute Goals and Plan of Care (Insert Text) physical Therapy EVALUATION/DISCHARGE Patient: Harley Aschoff (20 y.o. male) Date: 11/25/2018 Primary Diagnosis: COPD exacerbation ([address] Precautions: standard ASSESSMENT : 
Based on the objective data described below, the patient presents with good strength, balance, functional mobility skills, and decreased activity tolerance. Patient agreeable to mobility. Independent bed mobility and transfers. Patient [id number] on 3L O2 and no assistive device. Patient steady ambulating with no LOB. [place]  fluctuates somewhat. Patient declined practicing stairs. O2 sats after 80' 91% and 78% after 180'. O2 increased to 4 and patient encouraged to perform pursed lip breathing. O2 sats increase to 94% after 4 minutes of sitting so O2 decreased back to 3.5 L. Prior to admission patient was independent with all mobility and lives with wife. Patient was on oxygen at night and as needed during the day. Patient has been to outpatient pulmonary rehab previously, and while he endorses improvement, is not interested in returning due to distance from home. Patient has just joined a gym and plans to begin increasing activity once home and discussed with MD.  Patient does not feel he needs further PT at this time. Patient is independent with mobility and primary issue is respiratory status. Patient is safe to ambulate in halls with assistance for O2 tank. Will D/C from PT at this time. Skilled physical therapy is not indicated at this time. PLAN : 
Discharge Recommendations: Outpatient (or gym under Dr. Александр To) Further Equipment Recommendations for Discharge: none SUBJECTIVE:  
Patient stated I feel okay.  OBJECTIVE DATA SUMMARY:  
HISTORY:   
Past Medical History:  
Diagnosis Date  CAD (coronary artery disease)  Calculus of kidney  COPD   
 sees  dr. Carla Jones [facility]  Hypertension  Ill-defined condition   
 oxygen as needed--2 liters  Ill-defined condition   
 tremors in thumb, rt  Inguinal hernia   
 right  Primary pulmonary HTN ([facility] [address] Past Surgical History:  
Procedure Laterality Date  CARDIAC SURG PROCEDURE UNLIST    
 heart cath.  CARDIAC SURG PROCEDURE UNLIST  01/2018  
 echo 50-55% EF  
 HX CATARACT REMOVAL    
 bilateral  
 HX HERNIA REPAIR    
 lt  HX OTHER SURGICAL    
 colonoscopy  HX TONSILLECTOMY Prior Level of Function/Home Situation: Lives with wife in one story home Personal factors and/or comorbidities impacting plan of care:  
 
Home Situation Home Environment: Private residence # Steps to Enter: 5 Rails to Enter: Yes One/Two Story Residence: One story Living Alone: No 
Support Systems: Family member(s) Patient Expects to be Discharged to[de-identified] Private residence Current DME Used/Available at Home: Oxygen, portableEXAMINATION/PRESENTATION/DECISION MAKING:  
Critical Behavior: 
Neurologic State: Alert Orientation Level: Oriented X4 Cognition: Appropriate decision making Safety/Judgement: Awareness of environment, Insight into deficits Hearing: Auditory Auditory Impairment: NoneSkin:  intact Edema: none noted Range Of Motion: 
AROM: Within functional limits PROM: Within functional limits Strength:   
Strength: Within functional limits Tone & Sensation:  
Tone: Normal 
  
  
  
  
Sensation: Intact Coordination: 
Coordination: Within functional limits Vision:  
  
Functional Mobility: 
Bed Mobility: 
Rolling: Independent Supine to Sit: Independent Sit to Supine: Independent Scooting: Independent Transfers: 
Sit to Stand: Independent Stand to Sit: Independent Bed to Chair: Independent Balance:  
Sitting: Intact Standing: IntactAmbulation/Gait Training: 
Distance (ft): 180 Feet (ft) Assistive Device: Gait belt Ambulation - Level of Assistance: Supervision Speed/Loren: Fluctuations Functional Measure: 
Barthel Index: 
 
Bathin Bladder: 10 Bowels: 10 
Groomin Dressing: 10 Feeding: 10 Mobility: 15 
Stairs: 5 Toilet Use: 10 Transfer (Bed to Chair and Back): 15 Total: 95 Barthel and G-code impairment scale: 
Percentage of impairment CH 
0% CI 
1-19% CJ 
20-39% CK 
40-59% CL 
60-79% CM 
80-99% CN 
100% Barthel Score 0-100 100 99-80 79-60 59-40 20-39 1-19 
 0 Barthel Score 0-20 20 17-19 13-16 9-12 5-8 1-4 0 The Barthel ADL Index: Guidelines 1. The index should be used as a record of what a patient does, not as a record of what a patient could do. 2. The main aim is to establish degree of independence from any help, physical or verbal, however minor and for whatever reason. 3. The need for supervision renders the patient not independent. 4. A patient's performance should be established using the best available evidence. Asking the patient, friends/relatives and nurses are the usual sources, but direct observation and common sense are also important. However direct testing is not needed. 5. Usually the patient's performance over the preceding 24-48 hours is important, but occasionally longer periods will be relevant. 6. Middle categories imply that the patient supplies over 50 per cent of the effort. 7. Use of aids to be independent is allowed. Demario Damon., Barthel, D.W. (0431). Functional evaluation: the Barthel Index. [address] [facility] (14)2. ANCA Mcdaniel, Funmi Davis, Francy Elliott., [place], [address] [place] [address] (). Measuring the change indisability after inpatient rehabilitation; comparison of the responsiveness of the Barthel Index and Functional [place] Measure. Journal of Neurology, Neurosurgery, and Psychiatry, 66(4), 937-726.  
ALDO Turner, MANUEL Hendrix, Kamilah Beasley MMiladysA. (2004.) Assessment of post-stroke quality of life in cost-effectiveness studies: The usefulness of the Barthel Index and the EuroQoL-5D. [facility], 11, 148-89 G codes: In compliance with CMSs Claims Based Outcome Reporting, the following G-code set was chosen for this patient based on their primary functional limitation being treated: The outcome measure chosen to determine the severity of the functional limitation was the Barthel index with a score of 95/100 which was correlated with the impairment scale. ? Mobility - Walking and Moving Around:  
  - CURRENT STATUS: CI 
  - GOAL STATUS: CI 
  - D/C STATUS: CI Physical Therapy Evaluation Charge Determination History Examination Presentation Decision-Making MEDIUM  Complexity : 1-2 comorbidities / personal factors will impact the outcome/ POC  LOW Complexity : 1-2 Standardized tests and measures addressing body structure, function, activity limitation and / or participation in recreation  LOW Complexity : Stable, uncomplicated  LOW Complexity : FOTO score of  Based on the above components, the patient evaluation is determined to be of the following complexity level: LOW Pain: 
Pain Scale 1: Numeric (0 - 10) Pain Intensity 1: 0 Activity Tolerance:  
good Please refer to the flowsheet for vital signs taken during this treatment. After treatment:  
[]   Patient left in no apparent distress sitting up in chair 
[x]   Patient left in no apparent distress in bed 
[x]   Call bell left within reach [x]   Nursing notified 
[x]   Caregiver present 
[]   Bed alarm activated COMMUNICATION/EDUCATION:  
Communication/Collaboration: 
[x]   Fall prevention education was provided and the patient/caregiver indicated understanding. [x]   Patient/family have participated as able and agree with findings and recommendations. []   Patient is unable to participate in plan of care at this time. Findings and recommendations were discussed with: Registered Nurse Thank you for this referral. 
Yaz Izaguirre, PT Time Calculation: 20 mins

## 2018-11-26 LAB
ANION GAP SERPL CALC-SCNC: 5 MMOL/L (ref 5–15)
BUN SERPL-MCNC: 9 MG/DL (ref 6–20)
BUN/CREAT SERPL: 12 (ref 12–20)
CALCIUM SERPL-MCNC: 8.1 MG/DL (ref 8.5–10.1)
CHLORIDE SERPL-SCNC: 103 MMOL/L (ref 97–108)
CO2 SERPL-SCNC: 27 MMOL/L (ref 21–32)
CREAT SERPL-MCNC: 0.73 MG/DL (ref 0.7–1.3)
ERYTHROCYTE [DISTWIDTH] IN BLOOD BY AUTOMATED COUNT: 17.5 % (ref 11.5–14.5)
GLUCOSE SERPL-MCNC: 113 MG/DL (ref 65–100)
HCT VFR BLD AUTO: 38.2 % (ref 36.6–50.3)
HGB BLD-MCNC: 12.3 G/DL (ref 12.1–17)
MAGNESIUM SERPL-MCNC: 2.4 MG/DL (ref 1.6–2.4)
MCH RBC QN AUTO: 26.7 PG (ref 26–34)
MCHC RBC AUTO-ENTMCNC: 32.2 G/DL (ref 30–36.5)
MCV RBC AUTO: 83 FL (ref 80–99)
NRBC # BLD: 0 K/UL (ref 0–0.01)
NRBC BLD-RTO: 0 PER 100 WBC
PHOSPHATE SERPL-MCNC: 2.7 MG/DL (ref 2.6–4.7)
PLATELET # BLD AUTO: 313 K/UL (ref 150–400)
PMV BLD AUTO: 10 FL (ref 8.9–12.9)
POTASSIUM SERPL-SCNC: 3.9 MMOL/L (ref 3.5–5.1)
RBC # BLD AUTO: 4.6 M/UL (ref 4.1–5.7)
SODIUM SERPL-SCNC: 135 MMOL/L (ref 136–145)
WBC # BLD AUTO: 10.8 K/UL (ref 4.1–11.1)

## 2018-11-26 PROCEDURE — 74011250637 HC RX REV CODE- 250/637: Performed by: HOSPITALIST

## 2018-11-26 PROCEDURE — 94640 AIRWAY INHALATION TREATMENT: CPT

## 2018-11-26 PROCEDURE — 74011000258 HC RX REV CODE- 258: Performed by: HOSPITALIST

## 2018-11-26 PROCEDURE — 65660000000 HC RM CCU STEPDOWN

## 2018-11-26 PROCEDURE — 94760 N-INVAS EAR/PLS OXIMETRY 1: CPT

## 2018-11-26 PROCEDURE — 85027 COMPLETE CBC AUTOMATED: CPT

## 2018-11-26 PROCEDURE — 77010033678 HC OXYGEN DAILY

## 2018-11-26 PROCEDURE — 92610 EVALUATE SWALLOWING FUNCTION: CPT

## 2018-11-26 PROCEDURE — 74011250636 HC RX REV CODE- 250/636: Performed by: HOSPITALIST

## 2018-11-26 PROCEDURE — 80048 BASIC METABOLIC PNL TOTAL CA: CPT

## 2018-11-26 PROCEDURE — 74011000250 HC RX REV CODE- 250: Performed by: HOSPITALIST

## 2018-11-26 PROCEDURE — 83735 ASSAY OF MAGNESIUM: CPT

## 2018-11-26 PROCEDURE — C9113 INJ PANTOPRAZOLE SODIUM, VIA: HCPCS | Performed by: HOSPITALIST

## 2018-11-26 PROCEDURE — 36415 COLL VENOUS BLD VENIPUNCTURE: CPT

## 2018-11-26 PROCEDURE — 84100 ASSAY OF PHOSPHORUS: CPT

## 2018-11-26 RX ORDER — POLYETHYLENE GLYCOL 3350 17 G/17G
17 POWDER, FOR SOLUTION ORAL DAILY
Status: DISCONTINUED | OUTPATIENT
Start: 2018-11-26 | End: 2018-11-28 | Stop reason: HOSPADM

## 2018-11-26 RX ORDER — HYDROCHLOROTHIAZIDE 12.5 MG/1
25-50 TABLET ORAL DAILY
COMMUNITY
End: 2018-11-28

## 2018-11-26 RX ORDER — IPRATROPIUM BROMIDE AND ALBUTEROL SULFATE 2.5; .5 MG/3ML; MG/3ML
3 SOLUTION RESPIRATORY (INHALATION)
Status: DISCONTINUED | OUTPATIENT
Start: 2018-11-26 | End: 2018-11-28

## 2018-11-26 RX ORDER — AMOXICILLIN 250 MG
2 CAPSULE ORAL
Status: DISCONTINUED | OUTPATIENT
Start: 2018-11-26 | End: 2018-11-28 | Stop reason: HOSPADM

## 2018-11-26 RX ORDER — HYDROCHLOROTHIAZIDE 25 MG/1
25-50 TABLET ORAL DAILY
Status: ON HOLD | COMMUNITY
End: 2018-11-26

## 2018-11-26 RX ORDER — BENZONATATE 100 MG/1
100 CAPSULE ORAL
Status: DISCONTINUED | OUTPATIENT
Start: 2018-11-26 | End: 2018-11-28 | Stop reason: HOSPADM

## 2018-11-26 RX ADMIN — POLYETHYLENE GLYCOL 3350 17 G: 17 POWDER, FOR SOLUTION ORAL at 22:00

## 2018-11-26 RX ADMIN — Medication 10 ML: at 16:50

## 2018-11-26 RX ADMIN — SODIUM CHLORIDE 40 MG: 9 INJECTION, SOLUTION INTRAMUSCULAR; INTRAVENOUS; SUBCUTANEOUS at 09:55

## 2018-11-26 RX ADMIN — AMLODIPINE BESYLATE 5 MG: 5 TABLET ORAL at 09:36

## 2018-11-26 RX ADMIN — Medication 10 ML: at 21:55

## 2018-11-26 RX ADMIN — AZITHROMYCIN MONOHYDRATE 500 MG: 500 INJECTION, POWDER, LYOPHILIZED, FOR SOLUTION INTRAVENOUS at 11:25

## 2018-11-26 RX ADMIN — METHYLPREDNISOLONE SODIUM SUCCINATE 60 MG: 125 INJECTION, POWDER, FOR SOLUTION INTRAMUSCULAR; INTRAVENOUS at 04:43

## 2018-11-26 RX ADMIN — IPRATROPIUM BROMIDE AND ALBUTEROL SULFATE 3 ML: .5; 3 SOLUTION RESPIRATORY (INHALATION) at 02:09

## 2018-11-26 RX ADMIN — CEFTRIAXONE 1 G: 1 INJECTION, POWDER, FOR SOLUTION INTRAMUSCULAR; INTRAVENOUS at 10:13

## 2018-11-26 RX ADMIN — METHYLPREDNISOLONE SODIUM SUCCINATE 60 MG: 125 INJECTION, POWDER, FOR SOLUTION INTRAMUSCULAR; INTRAVENOUS at 16:50

## 2018-11-26 RX ADMIN — STANDARDIZED SENNA CONCENTRATE AND DOCUSATE SODIUM 2 TABLET: 8.6; 5 TABLET, FILM COATED ORAL at 21:54

## 2018-11-26 RX ADMIN — IPRATROPIUM BROMIDE AND ALBUTEROL SULFATE 3 ML: .5; 3 SOLUTION RESPIRATORY (INHALATION) at 23:34

## 2018-11-26 RX ADMIN — METHYLPREDNISOLONE SODIUM SUCCINATE 60 MG: 125 INJECTION, POWDER, FOR SOLUTION INTRAMUSCULAR; INTRAVENOUS at 21:54

## 2018-11-26 RX ADMIN — METHYLPREDNISOLONE SODIUM SUCCINATE 60 MG: 125 INJECTION, POWDER, FOR SOLUTION INTRAMUSCULAR; INTRAVENOUS at 09:56

## 2018-11-26 RX ADMIN — MONTELUKAST SODIUM 10 MG: 10 TABLET, COATED ORAL at 09:33

## 2018-11-26 RX ADMIN — Medication 10 ML: at 04:44

## 2018-11-26 RX ADMIN — IPRATROPIUM BROMIDE AND ALBUTEROL SULFATE 3 ML: .5; 3 SOLUTION RESPIRATORY (INHALATION) at 20:17

## 2018-11-26 RX ADMIN — PRIMIDONE 1000 MG: 250 TABLET ORAL at 09:33

## 2018-11-26 RX ADMIN — SODIUM CHLORIDE 75 ML/HR: 900 INJECTION, SOLUTION INTRAVENOUS at 11:43

## 2018-11-26 RX ADMIN — PRAVASTATIN SODIUM 40 MG: 40 TABLET ORAL at 21:54

## 2018-11-26 RX ADMIN — Medication 10 ML: at 11:26

## 2018-11-26 RX ADMIN — IPRATROPIUM BROMIDE AND ALBUTEROL SULFATE 3 ML: .5; 3 SOLUTION RESPIRATORY (INHALATION) at 14:03

## 2018-11-26 NOTE — PROGRESS NOTES
Hospitalist Progress Note NAME: Saúl Moran :  1945 MRN:  [id number] Assessment / Plan: 
Acute on chronic hypoxic respiratory failure due to acute COPD exacerbation due to CAP, POA Sepsis POA resolved Baseline home O2 2 L at night History of primary pumonary hypertension  
-clinically: no improvement since yesterday/ wheezing and tight Leukocytosis resolved  
-cont aggressive IV steroids and jet nebs  
-AB: empiric CTX + zithromax  
-if not improving consider pulmonary consult  
-PTA pt is is on trelegy inhaler ( combination of LABA, LAMA and steroid) Would continue advair and incruse here  
-O2 to keep rosie >90%, wean as tolerated back to home level  
-CTA:1. No PE 2. Emphysema. 3. Right lower lobe airspace disease. 
-primary pulmonologist: Dr Flora Julian Hyponatremia 
-better 129--> 135, cont gentle hydration Holding hctz HTN  
-BP stable 
-dc hctz for hyponatremia Started on norvasc this admission GERD/ hiatal hernia/ incidenatal esophagitis 
-accidental finding by CT 
- iv Protonix 
-speech: MBS in am  
-d/w Dr Aishwarya Lee, will see later on  
-CT: Hiatal hernia with thickened esophagus suggesting esophagitis. Essential tremors, on primidone Hypokalemia Hyperlipidemia, cont statin Code status: DNR Surrogate Decision Maker: wife Arden Crown [id number] DVT Prophylaxis:h/h stable , start lovenox for dvt prophylaxis Baseline: , lives with wife, independent Recommended Disposition: Home w/Family 2-4 days Subjective: Chief Complaint / Reason for Physician Visit: following copd exacerbation / pneumonia / hyponatremia Dyspneic significantly with minimal exertion Wheezing and sound tight On 3 L Discussed with RN events overnight. Review of Systems: 
Symptom Y/N Comments  Symptom Y/N Comments Fever/Chills    Chest Pain Poor Appetite    Edema Cough y   Abdominal Pain Sputum    Joint Pain SOB/RYAN y   Pruritis/Rash Nausea/vomit    Tolerating PT/OT Diarrhea    Tolerating Diet Constipation    Other Could NOT obtain due to:   
 
Objective: VITALS:  
Last 24hrs VS reviewed since prior progress note. Most recent are: 
Patient Vitals for the past 24 hrs: 
 Temp Pulse Resp BP SpO2  
11/26/18 1431 98.3 °F (36.8 °C) 86 20 141/77 98 % 11/26/18 1403     96 % 11/26/18 1054 97.6 °F (36.4 °C) 85 20 155/82 99 % 11/26/18 0717 97.8 °F (36.6 °C) 77 20 143/81 97 % 11/26/18 0426 97.5 °F (36.4 °C) 89 18 141/74 90 % 11/26/18 0208     97 % 11/25/18 2303 97.5 °F (36.4 °C) 82 18 124/69 93 % 11/25/18 2033 98.1 °F (36.7 °C) 90 18 118/61 92 % 11/25/18 2004     94 % 11/25/18 1550 97.6 °F (36.4 °C) 89 20 125/71 97 % 11/25/18 1538     95 % Intake/Output Summary (Last 24 hours) at 11/26/2018 1511 Last data filed at 11/26/2018 1043 Gross per 24 hour Intake 1996.25 ml Output 625 ml Net 1371.25 ml PHYSICAL EXAM: 
General: WD, WN. Alert, cooperative, no acute distress, mild dyspnea with conversation  
EENT:  EOMI. Anicteric sclerae. MMM Resp:  Still with decreased air entry bilaterally, + end exp wheezing, no rales. No accessory muscle use CV:  Regular  rhythm,  No edema GI:  Soft, Non distended, Non tender.  +Bowel sounds Neurologic:  Alert and oriented X 3, normal speech, Psych:   Good insight. Not anxious nor agitated Skin:  No rashes. No jaundice Reviewed most current lab test results and cultures  YES Reviewed most current radiology test results   YES Review and summation of old records today    NO Reviewed patient's current orders and MAR    YES 
PMH/SH reviewed - no change compared to H&P 
________________________________________________________________________ Care Plan discussed with: 
  Comments Patient y Family  y Wife RN y   
Care Manager Consultant  james GARCIA   
 Multidiciplinary team rounds were held today with , nursing, pharmacist and clinical coordinator. Patient's plan of care was discussed; medications were reviewed and discharge planning was addressed. ________________________________________________________________________ Total NON critical care TIME:  35   Minutes Total CRITICAL CARE TIME Spent:   Minutes non procedure based Comments >50% of visit spent in counseling and coordination of care    
________________________________________________________________________ Jefry Starks MD  
 
Procedures: see electronic medical records for all procedures/Xrays and details which were not copied into this note but were reviewed prior to creation of Plan. LABS: 
I reviewed today's most current labs and imaging studies. Pertinent labs include: 
Recent Labs  
  11/26/18 
0446 11/25/18 
0508 11/24/18 
0944 WBC 10.8 11.2* 12.7* HGB 12.3 12.9 15.1 HCT 38.2 39.1 44.9  298 299 Recent Labs  
  11/26/18 
0446 11/25/18 
0508 11/24/18 
9678 * 134* 129*  
K 3.9 3.5 3.3*  
 98 89* CO2 27 30 35* * 119* 95 BUN 9 10 8 CREA 0.73 0.67* 0.79 CA 8.1* 8.1* 8.6 MG 2.4  --   --   
PHOS 2.7  --   --   
ALB  --   --  3.5 TBILI  --   --  0.3 SGOT  --   --  15 ALT  --   --  21 Signed: Jefry Starks MD

## 2018-11-26 NOTE — PROGRESS NOTES
Call placed to Dr. Isaura Miller re: Pt would like to know why he is on Amlodipine and not HCTZ like he is at home?

## 2018-11-26 NOTE — PROGRESS NOTES
Problem: Patient Education: Go to Patient Education Activity Goal: Patient/Family Education Outcome: Progressing Towards Goal 
Speech Therapy saw patient today and evaluated swallowing,  Plan is for xray barium test tomorrow.

## 2018-11-26 NOTE — PROGRESS NOTES
OT note: OT orders received and chart was reviewed and per nursing pt has been walking often in the avila on 4 liters and doing well. OT spoke with pt and pts wife and pt reports that he is independent with bed mobility, transfers to toilet, chair and bed, and able to bathe and dress self. Pt reports that he is at his baseline and no OT needs. Will sign off.

## 2018-11-26 NOTE — PROGRESS NOTES
Problem: Falls - Risk of 
Goal: *Absence of Falls Document [facility] Fall Risk and appropriate interventions in the flowsheet. Outcome: Progressing Towards Goal 
Fall Risk Interventions: 
  
 
  
 
  
 
  
 
  
 
 
 
Problem: Chronic Obstructive Pulmonary Disease (COPD) Goal: *Able to remain out of bed as prescribed Outcome: Progressing Towards Goal 
Pt uses oxygen at home via N/C with activity. Pt up in chair and up to bathroom and walking in hallway.

## 2018-11-26 NOTE — PROGRESS NOTES
General Surgery End of Shift Nursing Note Bedside shift change report given to Odilon Ramírez (oncoming nurse) by Chai Parr (offgoing nurse). Report included the following information SBAR, Kardex, ED Summary, OR Summary, Procedure Summary, Intake/Output, MAR, Accordion and Recent Results. Shift worked:   [id number] Summary of shift:    Pt alert and oriented, no c/o pain. Wheezing heard in lungs. Pt on 3 litters of O2 Issues for physician to address:  no bowel movement Number times ambulated in hallway past shift: 0 Number of times OOB to chair past shift: 3 Pain Management: 
Current medication: See [facility] Patient states pain is manageable on current pain medication: YES 
 
GI: 
 
Current diet:  No diet orders on file Tolerating current diet: YES Passing flatus: YES Last Bowel Movement: several days ago Respiratory: 
 
Incentive Spirometer at bedside: YES Patient instructed on use: YES Patient Safety: 
 
Falls Score: 2 Bed Alarm On? No 
Sitter?  No 
 
Purvi Muniz RN

## 2018-11-26 NOTE — CONSULTS
Gastroenterology Consult Note  NAME: Saima Cortez : 1945 MRN: [id number]   ATTG: [unfilled] PCP: Erin Alcazar MD  Date/Time:  2018 5:19 PM  Subjective:   REASON FOR CONSULT:      [place] Candel[place] is a 71 y.o.   male who I was asked to see for \"GERD/ hiatal hernia/ incidenatal esophagitis  -accidental finding on CT\". He has a hx of COPD and is now admitted with the same. SLP also saw the pt and MBS is planned for tomorrow(\"The patient presents with functional swallow of all textures. He had no overt s/s of aspiration. With his COPD would recommend MBS to determine if he could be a silent aspirator\"). His breathing is a little better but not entirely. C.o YOCASTA and intermittent solid food dysphagia. CTA done for his respitaroy issues on  showed:  1. No pulmonary embolus. 2. Emphysema. 3. Right lower lobe airspace disease. 4. Hiatal hernia with thickened esophagus suggesting esophagitis. Past Medical History:   Diagnosis Date    CAD (coronary artery disease)     Calculus of kidney     COPD     [facility]  [facility]. Elodia [facility]  Hypertension     Ill-defined condition     oxygen as needed--2 liters    Ill-defined condition     tremors in thumb, rt    Inguinal hernia     right    Primary pulmonary HTN ([address]       Past Surgical History:   Procedure Laterality Date    CARDIAC SURG PROCEDURE UNLIST      heart cath.     CARDIAC SURG PROCEDURE UNLIST  2018    echo 50-55% EF    HX CATARACT REMOVAL      bilateral    HX HERNIA REPAIR      lt    HX OTHER SURGICAL      colonoscopy    HX TONSILLECTOMY       Social History     Tobacco Use    Smoking status: Former Smoker     Packs/day: 1.00     Years: 44.00     Pack years: 44.00     Start date: 1961     Last attempt to quit: 2005     Years since quittin.4    Smokeless tobacco: Never Used   Substance Use Topics    Alcohol use: No      Family History   Problem Relation Age of Onset    Heart Disease Brother Allergies   Allergen Reactions    Codeine Other (comments)     Keeps him awake      Home Medications:  Prior to Admission Medications   Prescriptions Last Dose Informant Patient Reported? Taking? GUAIFENESIN/DEXTROMETHORPHAN ([facility] [employer] [facility] DM PO) 11/23/2018 at Unknown time  Yes Yes   Sig: Take  by mouth two (2) times a day. fluticasone/umeclidin/vilanter (TRELEGY ELLIPTA IN) 11/23/2018 at 0900  Yes Yes   Sig: Take  by inhalation. hydroCHLOROthiazide (HYDRODIURIL) 12.5 mg tablet 11/23/2018  Yes Yes   Sig: Take 25-50 mg by mouth daily. 12.5 mg per cardiologist prescription, but patient reports that he takes 25-50 mg daily    montelukast (SINGULAIR) 10 mg tablet Not Taking at Unknown time  Yes No   Sig: Take 10 mg by mouth daily. pravastatin (PRAVACHOL) 40 mg tablet 11/23/2018 at 2100  Yes Yes   Sig: Take  by mouth.   primidone (MYSOLINE) 250 mg tablet 11/23/2018 at 0900  Yes Yes   Sig: Take 1,000 mg by mouth daily.       Facility-Administered Medications: None     Hospital medications:  Current Facility-Administered Medications   Medication Dose Route Frequency    azithromycin (ZITHROMAX) 500 mg in 0.9% sodium chloride 250 mL ([id number])  500 mg IntraVENous Q24H    albuterol-ipratropium (DUO-NEB) 2.5 MG-0.5 MG/3 ML  3 mL Nebulization Q4H RT    benzonatate (TESSALON) capsule 100 mg  100 mg Oral TID PRN    senna-docusate (PERICOLACE) 8.6-50 mg per tablet 2 Tab  2 Tab Oral QHS    polyethylene glycol (MIRALAX) packet 17 g  17 g Oral DAILY    0.9% sodium chloride infusion  75 mL/hr IntraVENous CONTINUOUS    fluticasone-umeclidin-vilanter 100-62.5-25 mcg dsdv 1 Puff (Patient Supplied)  1 Puff Inhalation DAILY    sodium chloride (NS) flush 5-10 mL  5-10 mL IntraVENous PRN    sodium chloride (NS) flush 5-10 mL  5-10 mL IntraVENous Q8H    sodium chloride (NS) flush 5-10 mL  5-10 mL IntraVENous PRN    acetaminophen (TYLENOL) tablet 650 mg  650 mg Oral Q4H PRN    ondansetron (ZOFRAN) injection 4 mg  4 mg IntraVENous Q4H PRN    bisacodyl (DULCOLAX) tablet 5 mg  5 mg Oral DAILY PRN    montelukast (SINGULAIR) tablet 10 mg  10 mg Oral DAILY    pravastatin (PRAVACHOL) tablet 40 mg  40 mg Oral QHS    primidone (MYSOLINE) tablet 1,000 mg  1,000 mg Oral DAILY    cefTRIAXone (ROCEPHIN) 1 g in 0.9% sodium chloride (MBP/ADV) 50 mL  1 g IntraVENous Q24H    hydrALAZINE (APRESOLINE) 20 mg/mL injection 10 mg  10 mg IntraVENous Q6H PRN    amLODIPine (NORVASC) tablet 5 mg  5 mg Oral DAILY    pantoprazole (PROTONIX) 40 mg in sodium chloride 0.9% 10 mL injection  40 mg IntraVENous DAILY    methylPREDNISolone (PF) (SOLU-MEDROL) injection 60 mg  60 mg IntraVENous Q6H     REVIEW OF SYSTEMS:     []     Unable to obtain  ROS due to  []    mental status change  []    sedated   []    intubated   [x]    Total of 11 systems reviewed as follows:  Const:  negative fever, negative chills, negative weight loss  Eyes:   negative diplopia or visual changes, negative eye pain  ENT:   negative coryza, negative sore throat  Resp:   negative cough, hemoptysis, dyspnea  Cards:  negative for chest pain, palpitations, lower extremity edema  :  negative for frequency, dysuria and hematuria  Skin:   negative for rash and pruritus  Heme:  negative for easy bruising and gum/nose bleeding  MS:  negative for myalgias, arthralgias, back pain and muscle weakness  Neurolo:  negative for headaches, dizziness, vertigo, memory problems   Psych:  negative for feelings of anxiety, depression     Pertinent Positives include :    Objective:   VITALS:    Visit Vitals  /77 (BP 1 Location: Left arm, BP Patient Position: At rest)   Pulse 86   Temp 98.3 °F (36.8 °C)   Resp 20   Ht 5' 2\" (1.575 m)   Wt 62 kg (136 lb 11 oz)   SpO2 98%   BMI 25.00 kg/m²     Temp (24hrs), Av.8 °F (36.6 °C), Min:97.5 °F (36.4 °C), Max:98.3 °F (36.8 °C)    PHYSICAL EXAM:     General: mild resp distress   Eyes: No icterus;   ENMT: Lips,  unremarkable.    Heart: Heart sounds normal, S1,S2  Abdomen: Non-tender; bowel sounds present.  No hepatosplenomegaly    Neurologic: Alert and oriented   Psyc: Affect is appropriate   Extremities: No edema       LAB DATA REVIEWED:    Recent Results (from the past 48 hour(s))   METABOLIC PANEL, BASIC    Collection Time: 11/25/18  5:08 AM   Result Value Ref Range    Sodium 134 (L) 136 - 145 mmol/L    Potassium 3.5 3.5 - 5.1 mmol/L    Chloride 98 97 - 108 mmol/L    CO2 30 21 - 32 mmol/L    Anion gap 6 5 - 15 mmol/L    Glucose 119 (H) 65 - 100 mg/dL    BUN 10 6 - 20 MG/DL    Creatinine 0.67 (L) 0.70 - 1.30 MG/DL    BUN/Creatinine ratio 15 12 - 20      GFR est AA >60 >60 ml/min/1.73m2    GFR est non-AA >60 >60 ml/min/1.73m2    Calcium 8.1 (L) 8.5 - 10.1 MG/DL   CBC W/O DIFF    Collection Time: 11/25/18  5:08 AM   Result Value Ref Range    WBC 11.2 (H) 4.1 - 11.1 K/uL    RBC 4.79 4.10 - 5.70 M/uL    HGB 12.9 12.1 - 17.0 g/dL    HCT 39.1 36.6 - 50.3 %    MCV 81.6 80.0 - 99.0 FL    MCH 26.9 26.0 - 34.0 PG    MCHC 33.0 30.0 - 36.5 g/dL    RDW 17.1 (H) 11.5 - 14.5 %    PLATELET 768 015 - 399 K/uL    MPV 9.8 8.9 - 12.9 FL    NRBC 0.0 0  WBC    ABSOLUTE NRBC 0.00 0.00 - 3.68 K/uL   METABOLIC PANEL, BASIC    Collection Time: 11/26/18  4:46 AM   Result Value Ref Range    Sodium 135 (L) 136 - 145 mmol/L    Potassium 3.9 3.5 - 5.1 mmol/L    Chloride 103 97 - 108 mmol/L    CO2 27 21 - 32 mmol/L    Anion gap 5 5 - 15 mmol/L    Glucose 113 (H) 65 - 100 mg/dL    BUN 9 6 - 20 MG/DL    Creatinine 0.73 0.70 - 1.30 MG/DL    BUN/Creatinine ratio 12 12 - 20      GFR est AA >60 >60 ml/min/1.73m2    GFR est non-AA >60 >60 ml/min/1.73m2    Calcium 8.1 (L) 8.5 - 10.1 MG/DL   CBC W/O DIFF    Collection Time: 11/26/18  4:46 AM   Result Value Ref Range    WBC 10.8 4.1 - 11.1 K/uL    RBC 4.60 4.10 - 5.70 M/uL    HGB 12.3 12.1 - 17.0 g/dL    HCT 38.2 36.6 - 50.3 %    MCV 83.0 80.0 - 99.0 FL    MCH 26.7 26.0 - 34.0 PG    MCHC 32.2 30.0 - 36.5 g/dL    RDW 17.5 (H) 11.5 - 14.5 % PLATELET 746 854 - 496 K/uL    MPV 10.0 8.9 - 12.9 FL    NRBC 0.0 0  WBC    ABSOLUTE NRBC 0.00 0.00 - 0.01 K/uL   MAGNESIUM    Collection Time: 11/26/18  4:46 AM   Result Value Ref Range    Magnesium 2.4 1.6 - 2.4 mg/dL   PHOSPHORUS    Collection Time: 11/26/18  4:46 AM   Result Value Ref Range    Phosphorus 2.7 2.6 - 4.7 MG/DL     IMAGING RESULTS:   []      I have personally reviewed the actual   []    CXR  []    CT  []     US    Recommendations/Plan:    GERD  Abnormal CT    Active Problems:    HTN (hypertension) (4/12/2016)      COPD exacerbation (HCC) (11/24/2018)       ___________________________________________________  RECOMMENDATIONS:      · Agree with treating his admitting symptoms. · Will start with a barium swallow to evaluate the esophagus after MBS is done and completed as per SLP  · EGD likely as OP when acute resp: issues are addressed and resolved  · Agree with PPI until BAS is done. Thank you for entrusting me with this patient's care. Please do not hesitate to contact me with any questions or if I can be of assistance with this patient or any of your other patients' GI needs. Discussed Code Status:    [x]    Full Code      []    DNR    ___________________________________________________  Care Plan discussed with:    [x]    Patient   []    Family   []    Nursing   [x]    Attending     ___________________________________________________  GI: Domenic Ormond A. Shah,MD

## 2018-11-26 NOTE — PROGRESS NOTES
Problem: Dysphagia (Adult) Goal: *Acute Goals and Plan of Care (Insert Text) 
11/26/2018 Speech path goals: 1. Pt will participate with MBS tomorrow. Speech LAnguage Pathology bedside swallow evaluation Patient: Chelsea Landeros (72 y.o. male) Date: 11/26/2018 Primary Diagnosis: COPD exacerbation ([address] Precautions: aspiration ASSESSMENT : 
Based on the objective data described below, the patient presents with functional swallow of all textures. He had no overt s/s of aspiration. With his COPD would recommend MBS to determine if he could be a silent aspirator. Explained that to the pt who is agreeable to it. . 
 
Patient will benefit from skilled intervention to address the above impairments. Patients rehabilitation potential is considered to be Good Factors which may influence rehabilitation potential include:  
[]            None noted [x]            Mental ability/status [x]            Medical condition [x]            Home/family situation and support systems 
[]            Safety awareness 
[]            Pain tolerance/management []            Other: PLAN : 
Recommendations and Planned Interventions: MBS tomorrow Frequency/Duration: Patient will be followed by speech-language pathology 3 times a week to address goals. Discharge Recommendations: None SUBJECTIVE:  
Patient stated his name rhymed with puzzle. OBJECTIVE:  
 
Past Medical History:  
Diagnosis Date  CAD (coronary artery disease)  Calculus of kidney  COPD   
 sees  dr. Cortez Harvey [facility]  Hypertension  Ill-defined condition   
 oxygen as needed--2 liters  Ill-defined condition   
 tremors in thumb, rt  Inguinal hernia   
 right  Primary pulmonary HTN ([address] Past Surgical History:  
Procedure Laterality Date  CARDIAC SURG PROCEDURE UNLIST    
 heart cath.   
 CARDIAC SURG PROCEDURE UNLIST  01/2018  
 echo 50-55% EF  
 HX CATARACT REMOVAL    
 bilateral  
 HX HERNIA REPAIR    
 lt  
 HX OTHER SURGICAL    
 colonoscopy  HX TONSILLECTOMY Prior Level of Function/Home Situation:  
Home Situation Home Environment: Private residence # Steps to Enter: 5 Rails to Enter: Yes One/Two Story Residence: One story Living Alone: No 
Support Systems: Family member(s) Patient Expects to be Discharged to[de-identified] Private residence Current DME Used/Available at Home: Oxygen, portable Diet prior to admission:  
Current Diet: reg/thins Cognitive and Communication Status: 
Neurologic State: Alert Orientation Level: Oriented X4 Cognition: Appropriate decision making, Appropriate for age attention/concentration, Appropriate safety awareness Perception: Appears intact Perseveration: No perseveration noted Safety/Judgement: Awareness of environment, Insight into deficits Oral Assessment: 
Oral Assessment Labial: No impairment Dentition: Upper & lower dentures;Edentulous Lingual: No impairment Velum: No impairment Mandible: No impairment P.O. Trials: 
Patient Position: upright in bed Vocal quality prior to P.O.: No impairment Consistency Presented: Thin liquid; Solid;Puree How Presented: Self-fed/presented;Cup/gulp Bolus Acceptance: No impairment Bolus Formation/Control: No impairment Propulsion: No impairment Oral Residue: None Initiation of Swallow: No impairment Laryngeal Elevation: Functional 
Aspiration Signs/Symptoms: None Pharyngeal Phase Characteristics: No impairment, issues, or problems Oral Phase Severity: No impairment Pharyngeal Phase Severity : No impairment NOMS:  
The NOMS functional outcome measure was used to quantify this patient's level of swallowing impairment. Based on the NOMS, the patient was determined to be at level 6 for swallow function G Codes: In compliance with CMSs Claims Based Outcome Reporting, the following G-code set was chosen for this patient based the use of the NOMS functional outcome to quantify this patient's level of swallowing impairment. Using the NOMS, the patient was determined to be at level 6 for swallow function which correlates with the CI= 1-19% level of severity. Based on the objective assessment provided within this note, the current, goal, and discharge g-codes are as follows: 
 
Swallow  Swallowing: 
 Swallow Current Status CI= 1-19%  Swallow Goal Status CH= 0% NOMS Swallowing Levels: 
Level 1 (CN): NPO Level 2 (CM): NPO but takes consistency in therapy Level 3 (CL): Takes less than 50% of nutrition p.o. and continues with nonoral feedings; and/or safe with mod cues; and/or max diet restriction Level 4 (CK): Safe swallow but needs mod cues; and/or mod diet restriction; and/or still requires some nonoral feeding/supplements Level 5 (CJ): Safe swallow with min diet restriction; and/or needs min cues Level 6 (CI): Independent with p.o.; rare cues; usually self cues; may need to avoid some foods or needs extra time Level 7 (CH): Independent for all p.o. ERICK. (2003). National Outcomes Measurement System (NOMS): Adult Speech-Language Pathology User's Guide. Pain: 
Pain Scale 1: Numeric (0 - 10) Pain Intensity 1: 0 After treatment:  
[]            Patient left in no apparent distress sitting up in chair 
[x]            Patient left in no apparent distress in bed 
[x]            Call bell left within reach [x]            Nursing notified 
[]            Caregiver present 
[]            Bed alarm activated COMMUNICATION/EDUCATION:  
The patients plan of care including recommendations, planned interventions, and recommended diet changes were discussed with: Registered Nurse. []            Posted safety precautions in patient's room. []            Patient/family have participated as able in goal setting and plan of care. []            Patient/family agree to work toward stated goals and plan of care. []            Patient understands intent and goals of therapy, but is neutral about his/her participation. []            Patient is unable to participate in goal setting and plan of care. Thank you for this referral. 
Natasha Nava, BRIANNA Time Calculation: 15 mins

## 2018-11-26 NOTE — PROGRESS NOTES
Call placed to Dr. Leonora Reno re:  Can he have Milk of Magnkumar markhamight? He had a very, very small BM this am and he is used to going more. He had prune juice and applesauce yesterday and wants something else

## 2018-11-26 NOTE — PROGRESS NOTES
General Surgery End of Shift Nursing Note Bedside shift change report given to [facility] RN and Yovanny Camejo RN (oncoming nurse) by Hobert Hatchet RN (offgoing nurse). Report included the following information SBAR, Kardex, Intake/Output, MAR, Recent Results and Cardiac Rhythm SR. Shift worked:   7p-7a Summary of shift:    Pt coughing more frequently, productive and nonproductive, dry hacking cough Issues for physician to address:  cough med? Number times ambulated in hallway past shift: 0 Number of times OOB to chair past shift: 0 Pain Management: 
Current medication: none requested Patient states pain is manageable on current pain medication: YES 
 
GI: 
 
Current diet:  No diet orders on file Tolerating current diet: YES Passing flatus: YES Last Bowel Movement: several days ago Appearance:  Pt stated he is drinking more water, and had 2 prune juices yesterday Respiratory: 
 
Incentive Spirometer at bedside: NO 
Patient instructed on use: NO 
 
Patient Safety: 
 
Falls Score: 2 Bed Alarm On? No 
Sitter?  No 
 
Sammi Ruiz RN

## 2018-11-26 NOTE — INTERDISCIPLINARY ROUNDS
Interdisciplinary Rounds were completed on this patient. Rounds included nursing, clinical care leader, pharmacy, and case management. Patient admitted for COPD Patient had the following concerns: no concerns Goals for the day will include: IV steroids, IV ABX, duo nebs Anticipated discharge date: Home TBD

## 2018-11-27 ENCOUNTER — APPOINTMENT (OUTPATIENT)
Dept: GENERAL RADIOLOGY | Age: 73
DRG: 871 | End: 2018-11-27
Attending: HOSPITALIST
Payer: MEDICARE

## 2018-11-27 LAB
ANION GAP SERPL CALC-SCNC: 5 MMOL/L (ref 5–15)
BUN SERPL-MCNC: 11 MG/DL (ref 6–20)
BUN/CREAT SERPL: 19 (ref 12–20)
CALCIUM SERPL-MCNC: 8.1 MG/DL (ref 8.5–10.1)
CHLORIDE SERPL-SCNC: 103 MMOL/L (ref 97–108)
CO2 SERPL-SCNC: 29 MMOL/L (ref 21–32)
CREAT SERPL-MCNC: 0.59 MG/DL (ref 0.7–1.3)
GLUCOSE SERPL-MCNC: 119 MG/DL (ref 65–100)
POTASSIUM SERPL-SCNC: 4.2 MMOL/L (ref 3.5–5.1)
SODIUM SERPL-SCNC: 137 MMOL/L (ref 136–145)

## 2018-11-27 PROCEDURE — 74011250636 HC RX REV CODE- 250/636: Performed by: HOSPITALIST

## 2018-11-27 PROCEDURE — 80048 BASIC METABOLIC PNL TOTAL CA: CPT

## 2018-11-27 PROCEDURE — 74011000250 HC RX REV CODE- 250: Performed by: HOSPITALIST

## 2018-11-27 PROCEDURE — 65660000000 HC RM CCU STEPDOWN

## 2018-11-27 PROCEDURE — 36415 COLL VENOUS BLD VENIPUNCTURE: CPT

## 2018-11-27 PROCEDURE — 74011000258 HC RX REV CODE- 258: Performed by: HOSPITALIST

## 2018-11-27 PROCEDURE — 77030027138 HC INCENT SPIROMETER -A

## 2018-11-27 PROCEDURE — 77010033678 HC OXYGEN DAILY

## 2018-11-27 PROCEDURE — 94640 AIRWAY INHALATION TREATMENT: CPT

## 2018-11-27 PROCEDURE — 94760 N-INVAS EAR/PLS OXIMETRY 1: CPT

## 2018-11-27 PROCEDURE — 74230 X-RAY XM SWLNG FUNCJ C+: CPT

## 2018-11-27 PROCEDURE — C9113 INJ PANTOPRAZOLE SODIUM, VIA: HCPCS | Performed by: HOSPITALIST

## 2018-11-27 PROCEDURE — 92611 MOTION FLUOROSCOPY/SWALLOW: CPT

## 2018-11-27 PROCEDURE — 74011250637 HC RX REV CODE- 250/637: Performed by: HOSPITALIST

## 2018-11-27 RX ORDER — PREDNISONE 20 MG/1
40 TABLET ORAL
Status: DISCONTINUED | OUTPATIENT
Start: 2018-11-28 | End: 2018-11-28 | Stop reason: HOSPADM

## 2018-11-27 RX ADMIN — Medication 10 ML: at 13:03

## 2018-11-27 RX ADMIN — IPRATROPIUM BROMIDE AND ALBUTEROL SULFATE 3 ML: .5; 3 SOLUTION RESPIRATORY (INHALATION) at 07:44

## 2018-11-27 RX ADMIN — SODIUM CHLORIDE 75 ML/HR: 900 INJECTION, SOLUTION INTRAVENOUS at 16:03

## 2018-11-27 RX ADMIN — Medication 10 ML: at 05:27

## 2018-11-27 RX ADMIN — PRIMIDONE 1000 MG: 250 TABLET ORAL at 09:26

## 2018-11-27 RX ADMIN — Medication 10 ML: at 22:10

## 2018-11-27 RX ADMIN — IPRATROPIUM BROMIDE AND ALBUTEROL SULFATE 3 ML: .5; 3 SOLUTION RESPIRATORY (INHALATION) at 15:13

## 2018-11-27 RX ADMIN — SODIUM CHLORIDE 40 MG: 9 INJECTION, SOLUTION INTRAMUSCULAR; INTRAVENOUS; SUBCUTANEOUS at 09:25

## 2018-11-27 RX ADMIN — METHYLPREDNISOLONE SODIUM SUCCINATE 60 MG: 125 INJECTION, POWDER, FOR SOLUTION INTRAMUSCULAR; INTRAVENOUS at 16:02

## 2018-11-27 RX ADMIN — METHYLPREDNISOLONE SODIUM SUCCINATE 60 MG: 125 INJECTION, POWDER, FOR SOLUTION INTRAMUSCULAR; INTRAVENOUS at 22:11

## 2018-11-27 RX ADMIN — IPRATROPIUM BROMIDE AND ALBUTEROL SULFATE 3 ML: .5; 3 SOLUTION RESPIRATORY (INHALATION) at 20:26

## 2018-11-27 RX ADMIN — SODIUM CHLORIDE 75 ML/HR: 900 INJECTION, SOLUTION INTRAVENOUS at 05:29

## 2018-11-27 RX ADMIN — STANDARDIZED SENNA CONCENTRATE AND DOCUSATE SODIUM 2 TABLET: 8.6; 5 TABLET, FILM COATED ORAL at 22:09

## 2018-11-27 RX ADMIN — CEFTRIAXONE 1 G: 1 INJECTION, POWDER, FOR SOLUTION INTRAMUSCULAR; INTRAVENOUS at 09:26

## 2018-11-27 RX ADMIN — IPRATROPIUM BROMIDE AND ALBUTEROL SULFATE 3 ML: .5; 3 SOLUTION RESPIRATORY (INHALATION) at 11:16

## 2018-11-27 RX ADMIN — AZITHROMYCIN MONOHYDRATE 500 MG: 500 INJECTION, POWDER, LYOPHILIZED, FOR SOLUTION INTRAVENOUS at 11:06

## 2018-11-27 RX ADMIN — AMLODIPINE BESYLATE 5 MG: 5 TABLET ORAL at 09:25

## 2018-11-27 RX ADMIN — MONTELUKAST SODIUM 10 MG: 10 TABLET, COATED ORAL at 09:25

## 2018-11-27 RX ADMIN — PRAVASTATIN SODIUM 40 MG: 40 TABLET ORAL at 22:10

## 2018-11-27 RX ADMIN — POLYETHYLENE GLYCOL 3350 17 G: 17 POWDER, FOR SOLUTION ORAL at 09:25

## 2018-11-27 RX ADMIN — IPRATROPIUM BROMIDE AND ALBUTEROL SULFATE 3 ML: .5; 3 SOLUTION RESPIRATORY (INHALATION) at 04:12

## 2018-11-27 RX ADMIN — METHYLPREDNISOLONE SODIUM SUCCINATE 60 MG: 125 INJECTION, POWDER, FOR SOLUTION INTRAMUSCULAR; INTRAVENOUS at 05:26

## 2018-11-27 RX ADMIN — METHYLPREDNISOLONE SODIUM SUCCINATE 60 MG: 125 INJECTION, POWDER, FOR SOLUTION INTRAMUSCULAR; INTRAVENOUS at 09:25

## 2018-11-27 NOTE — PROGRESS NOTES
Gastroenterology Progress Note 
 
11/27/2018 Admit Date: 11/24/2018 Subjective: Follow up for: GERD,esophagitis Very short winded this morning. MBS planned. Had coffee at 430 am. 
Patient was seen in rounds by me today. Current Facility-Administered Medications Medication Dose Route Frequency  azithromycin (ZITHROMAX) 500 mg in 0.9% sodium chloride 250 mL ([id number])  500 mg IntraVENous Q24H  
 albuterol-ipratropium (DUO-NEB) 2.5 MG-0.5 MG/3 ML  3 mL Nebulization Q4H RT  
 benzonatate (TESSALON) capsule 100 mg  100 mg Oral TID PRN  
 senna-docusate (PERICOLACE) 8.6-50 mg per tablet 2 Tab  2 Tab Oral QHS  polyethylene glycol (MIRALAX) packet 17 g  17 g Oral DAILY  0.9% sodium chloride infusion  75 mL/hr IntraVENous CONTINUOUS  
 fluticasone-umeclidin-vilanter 100-62.5-25 mcg dsdv 1 Puff (Patient Supplied)  1 Puff Inhalation DAILY  sodium chloride (NS) flush 5-10 mL  5-10 mL IntraVENous PRN  
 sodium chloride (NS) flush 5-10 mL  5-10 mL IntraVENous Q8H  
 sodium chloride (NS) flush 5-10 mL  5-10 mL IntraVENous PRN  
 acetaminophen (TYLENOL) tablet 650 mg  650 mg Oral Q4H PRN  
 ondansetron (ZOFRAN) injection 4 mg  4 mg IntraVENous Q4H PRN  
 bisacodyl (DULCOLAX) tablet 5 mg  5 mg Oral DAILY PRN  
 montelukast (SINGULAIR) tablet 10 mg  10 mg Oral DAILY  pravastatin (PRAVACHOL) tablet 40 mg  40 mg Oral QHS  primidone (MYSOLINE) tablet 1,000 mg  1,000 mg Oral DAILY  cefTRIAXone (ROCEPHIN) 1 g in 0.9% sodium chloride (MBP/ADV) 50 mL  1 g IntraVENous Q24H  hydrALAZINE (APRESOLINE) 20 mg/mL injection 10 mg  10 mg IntraVENous Q6H PRN  
 amLODIPine (NORVASC) tablet 5 mg  5 mg Oral DAILY  pantoprazole (PROTONIX) 40 mg in sodium chloride 0.9% 10 mL injection  40 mg IntraVENous DAILY  methylPREDNISolone (PF) (SOLU-MEDROL) injection 60 mg  60 mg IntraVENous Q6H Objective:  
 
Blood pressure 148/85, pulse 72, temperature 97.9 °F (36.6 °C), resp.  rate 17, height 5' 2\" (1.575 m), weight 62 kg (136 lb 11 oz), SpO2 93 %. No intake/output data recorded. 11/25 1901 - 11/27 0700 In: 3027.5 [P.O.:720; I.V.:2307.5] Out: 2600 [Urine:2600] Physical Examination:  
 
 
General:AAO x 3, SOB,pleasant HEENT:  EOMI, Chest:  wheezes Heart: S1, S2, RRR 
GI: Soft, NT, ND + bowel sounds Data Review Recent Results (from the past 24 hour(s)) METABOLIC PANEL, BASIC Collection Time: 11/27/18  4:14 AM  
Result Value Ref Range Sodium 137 136 - 145 mmol/L Potassium 4.2 3.5 - 5.1 mmol/L Chloride 103 97 - 108 mmol/L  
 CO2 29 21 - 32 mmol/L Anion gap 5 5 - 15 mmol/L Glucose 119 (H) 65 - 100 mg/dL BUN 11 6 - 20 MG/DL Creatinine 0.59 (L) 0.70 - 1.30 MG/DL  
 BUN/Creatinine ratio 19 12 - 20 GFR est AA >60 >60 ml/min/1.73m2 GFR est non-AA >60 >60 ml/min/1.73m2 Calcium 8.1 (L) 8.5 - 10.1 MG/DL Recent Labs  
  11/26/18 
0446 11/25/18 
2248 WBC 10.8 11.2* HGB 12.3 12.9 HCT 38.2 39.1  298 Recent Labs  
  11/27/18 
0414 11/26/18 
0446 11/25/18 
8305  135* 134* K 4.2 3.9 3.5  103 98 CO2 29 27 30 BUN 11 9 10 CREA 0.59* 0.73 0.67* * 113* 119* CA 8.1* 8.1* 8.1*  
MG  --  2.4  --   
PHOS  --  2.7  --   
 
Recent Labs  
  11/24/18 
0944 SGOT 15  
AP 79  
TP 8.5* ALB 3.5 GLOB 5.0* No results for input(s): INR, PTP, APTT in the last 72 hours. No lab exists for component: INREXT No results for input(s): FE, TIBC, PSAT, FERR in the last 72 hours. No results found for: FOL, RBCF No results for input(s): PH, PCO2, PO2 in the last 72 hours. No results for input(s): CPK, CKNDX, TROIQ in the last 72 hours. No lab exists for component: CPKMB Lab Results Component Value Date/Time  Cholesterol, total 162 04/12/2016 03:56 AM  
 HDL Cholesterol 20 04/12/2016 03:56 AM  
 LDL, calculated 104 (H) 04/12/2016 03:56 AM  
 Triglyceride 190 (H) 04/12/2016 03:56 AM  
 CHOL/HDL Ratio 8.1 (H) 04/12/2016 03:56 AM  
 
No components found for: [place] Lab Results Component Value Date/Time Color YELLOW/STRAW 11/24/2018 11:49 AM  
 Appearance CLEAR 11/24/2018 11:49 AM  
 Specific gravity 1.020 11/24/2018 11:49 AM  
 pH (UA) 7.0 11/24/2018 11:49 AM  
 Protein NEGATIVE  11/24/2018 11:49 AM  
 Glucose NEGATIVE  11/24/2018 11:49 AM  
 Ketone NEGATIVE  11/24/2018 11:49 AM  
 Bilirubin NEGATIVE  11/24/2018 11:49 AM  
 Urobilinogen 0.2 11/24/2018 11:49 AM  
 Nitrites NEGATIVE  11/24/2018 11:49 AM  
 Leukocyte Esterase NEGATIVE  11/24/2018 11:49 AM  
 Epithelial cells FEW 11/24/2018 11:49 AM  
 Bacteria NEGATIVE  11/24/2018 11:49 AM  
 WBC 0-4 11/24/2018 11:49 AM  
 RBC 0-5 11/24/2018 11:49 AM  
 
  
ROS: -CP, SOB, Dysuria, palpitations, cough. Assessment: 
GERD, [place] Esophagitis Active Problems: 
  HTN (hypertension) (4/12/2016) COPD exacerbation ([address] (11/24/2018) Plan/Discussion: 1. Not stable for EGD with sedation currently for a non urgent indication. He may need an EGD as OP. Treat GERD/esophagitis with PPI and GERD lifestyle changes/diet, 
2. MBS as per plan today. 3. Rest as per IM. Signed By: Chapincito Rahman.  Loki Diaz MD 
 
11/27/2018  7:51 AM

## 2018-11-27 NOTE — PROGRESS NOTES
General Surgery End of Shift Nursing Note Bedside shift change report given to [address] [place] [address] [address] (oncoming nurse) by Denisse Ha RN (offgoing nurse). Report included the following information SBAR, Kardex, Intake/Output, MAR, Recent Results and Cardiac Rhythm NSR. Shift worked:   7p-7a Summary of shift:    Patient complains of constipation, had small BM this early AM  
Issues for physician to address:   none Number times ambulated in hallway past shift: 0 Number of times OOB to chair past shift: 0 Pain Management: 
Current medication: no complaints of pain Patient states pain is manageable on current pain medication: YES 
 
GI: 
 
Current diet:  No diet orders on file Tolerating current diet: YES Passing flatus: YES Last Bowel Movement: today Appearance: small Respiratory: 
 
Incentive Spirometer at bedside: YES Patient instructed on use: YES Patient Safety: 
 
Falls Score: 1 Bed Alarm On? No 
Sitter? No 
 
Maikol Zamora

## 2018-11-27 NOTE — PROGRESS NOTES
Problem: Falls - Risk of 
Goal: *Absence of Falls Document [place] Fall Risk and appropriate interventions in the flowsheet. Outcome: Progressing Towards Goal 
Fall Risk Interventions: 
  
 
  
 
Medication Interventions: Evaluate medications/consider consulting pharmacy, Patient to call before getting OOB, Teach patient to arise slowly

## 2018-11-27 NOTE — PROGRESS NOTES
ADULT PROTOCOL: JET AEROSOL  REASSESSMENT Patient  Keily Davis     71 y.o.   male     11/27/2018  10:20 AM 
 
Breath Sounds Pre Procedure: Right Breath Sounds: Coarse, Expiratory wheezing Left Breath Sounds: Coarse, Expiratory wheezing Breath Sounds Post Procedure: Right Breath Sounds: Coarse, Expiratory wheezing Left Breath Sounds: Coarse, Expiratory wheezing Breathing pattern: Pre procedure Breathing Pattern: Regular Post procedure Breathing Pattern: Regular Heart Rate: Pre procedure Pulse: 76 Post procedure Pulse: 84 Resp Rate: Pre procedure Respirations: 20 
         Post procedure Respirations: 20 
 
     
 
Cough: Pre procedure Cough: Non-productive Post procedure Cough: Non-productive Oxygen: O2 Device: Nasal cannula   Flow rate (L/min) 3 lpm 
   Changed: NO SpO2: Pre procedure SpO2: 93 %   with oxygen Post procedure SpO2: 93 %  with oxygen Nebulizer Therapy: Current medications Aerosolized Medications: DuoNeb Changed: NO Smoking History: former smoker Problem List:  
Patient Active Problem List  
Diagnosis Code  Pulmonary hypertension (HCC) I27.20  
 ASHD (arteriosclerotic heart disease) I25.10  Chronic airway obstruction, not elsewhere classified J44.9  Acute pancreatitis K85.90  
 HTN (hypertension) I10  
 COPD (chronic obstructive pulmonary disease) (HCC) J44.9  Chronic respiratory failure with hypoxia ([facility]) J96.11  
 Hyperlipidemia E78.5  Right inguinal hernia K40.90  Pre-op testing Z01.818  
 COPD exacerbation ([facility] [address] J44.1 Respiratory Therapist: Tammy Crandall RT

## 2018-11-27 NOTE — PROGRESS NOTES
General Surgery End of Shift Nursing Note Bedside shift change report given to Mookie Love RN (oncoming nurse) by Juli Land RN (offgoing nurse). Report included the following information SBAR, Kardex, Intake/Output, MAR and Recent Results. Shift worked:   7a to 3p Summary of shift:    Up ad shobha, barium swallow today, and tolerating diet. Issues for physician to address:   none Number times ambulated in hallway past shift: in room Number of times OOB to chair past shift: 1 Pain Management: 
Current medication: none Patient states pain is manageable on current pain medication: YES 
 
GI: 
 
Current diet:  No diet orders on file Tolerating current diet: YES Passing flatus: YES Last Bowel Movement: today Appearance: formed Respiratory: 
 
Incentive Spirometer at bedside: YES Patient instructed on use: YES Patient Safety: 
 
Falls Score: 1 Bed Alarm On? No 
Sitter?  No 
 
Julia Donnelly RN

## 2018-11-27 NOTE — PROGRESS NOTES
Problem: Dysphagia (Adult) Goal: *Acute Goals and Plan of Care (Insert Text) 
11/26/2018 Speech path goals: 1. Pt will participate with MBS tomorrow. Speech Pathology Modified barium swallow Study/discharge Patient: Madeline Macias (20 y.o. male) Date: 11/27/2018 Primary Diagnosis: COPD exacerbation ([facility] [address] Precautions:     
 
ASSESSMENT : 
Based on the objective data described below, the patient presents with functional swallow of thins, purees and solids. Oral phase is characterized by mild oral residue with purees that he cleared with a second spontaneous swallow. Mild to mod pharyngeal dysphagia present. He had flash trace laryngeal penetration with thins that cleared with the swallow. He tolerated single sips and consecutive sips of thins with only flash penetration. With consecutive straw sips he had trace spot aspiration that he did not cough and clear. Reduced laryngeal closure allowing flash penetration, reduced tongue base retraction allowing for vallecular residue and mild swallow delay noted. His dysphagia is secondary to COPD most likely. Skilled therapy provided by a speech-language pathologist is not indicated at this time. PLAN : 
Recommendations and Planned Interventions: 
Reg/thins Single sips May want to forgo straws. Discharge Recommendations: None SUBJECTIVE:  
Patient was very loquacious and enjoyed conversing about a variety of topics. Joked with SLP. Susan Richards OBJECTIVE:  
 
Past Medical History:  
Diagnosis Date  CAD (coronary artery disease)  Calculus of kidney  COPD   
 sees  dr. Margo Villanueva [facility]  Hypertension  Ill-defined condition   
 oxygen as needed--2 liters  Ill-defined condition   
 tremors in thumb, rt  Inguinal hernia   
 right  Primary pulmonary HTN ([address] Past Surgical History:  
Procedure Laterality Date  CARDIAC SURG PROCEDURE UNLIST    
 heart cath.   
 CARDIAC SURG PROCEDURE UNLIST  01/2018  
 echo 50-55% EF  
  HX CATARACT REMOVAL    
 bilateral  
 HX HERNIA REPAIR    
 lt  HX OTHER SURGICAL    
 colonoscopy  HX TONSILLECTOMY Prior Level of Function/Home Situation:  
Home Situation Home Environment: Private residence # Steps to Enter: 5 Rails to Enter: Yes One/Two Story Residence: One story Living Alone: No 
Support Systems: Family member(s) Patient Expects to be Discharged to[de-identified] Private residence Current DME Used/Available at Home: Oxygen, portable Diet prior to admission: reg/thins Current Diet:  Reg/thins Radiologist:  Dr. Peter Acevedo Trial 1:  
Consistency Presented: Thin liquid;Puree; Solid How Presented: Self-fed/presented;Cup/sip;Cup/gulp; Successive swallows;Straw Bolus Acceptance: No impairment Bolus Formation/Control: No impairment:    
Propulsion: No impairment Oral Residue: Lingual  
Initiation of Swallow: Triggered at vallecula Timing: Pooling 1-5 sec Penetration: Flash/transient;Trace;During swallow; To laryngeal vestibule;From initial swallow Aspiration/Timing: Silent ;Trace; From initial swallow(only when drinking large continuous sips via straw) Pharyngeal Clearance: Vallecular residue;10-50%(with purees that cleared with a spontaneous dry swallow) Decreased Tongue Base Retraction?: Yes Laryngeal Elevation: Reduced excursion with laryngeal vestibule gap; Incomplete laryngeal closure Aspiration/Penetration Score: 7 (Aspiration-Contrast passes below the cords/, but is not ejected despite attempt) Pharyngeal Symmetry: Not assessed Pharyngeal-Esophageal Segment: No impairment Oral Phase Severity: No impairment Pharyngeal Phase Severity: Mild moderate NOMS:  
The NOMS functional outcome measure was used to quantify this patient's level of swallowing impairment. Based on the NOMS, the patient was determined to be at level 6 for swallow function G Codes: In compliance with CMSs Claims Based Outcome Reporting, the following G-code set was chosen for this patient based the use of the NOMS functional outcome to quantify this patient's level of swallowing impairment. Using the NOMS, the patient was determined to be at level 6 for swallow function which correlates with the CI= 1-19% level of severity. Based on the objective assessment provided within this note, the current, goal, and discharge g-codes are as follows: 
 
Swallow  Swallowing: 
 Swallow Current Status CI= 1-19%  Swallow Goal Status CI= 1-19%  Swallow D/C Status CI= 1-19% NOMS Swallowing Levels: 
Level 1 (CN): NPO Level 2 (CM): NPO but takes consistency in therapy Level 3 (CL): Takes less than 50% of nutrition p.o. and continues with nonoral feedings; and/or safe with mod cues; and/or max diet restriction Level 4 (CK): Safe swallow but needs mod cues; and/or mod diet restriction; and/or still requires some nonoral feeding/supplements Level 5 (CJ): Safe swallow with min diet restriction; and/or needs min cues Level 6 (CI): Independent with p.o.; rare cues; usually self cues; may need to avoid some foods or needs extra time Level 7 (CH): Independent for all p.o. ERICK. (2003). National Outcomes Measurement System (NOMS): Adult Speech-Language Pathology User's Guide. COMMUNICATION/EDUCATION:  
Patient was educated regarding His deficit(s) of dysphagia as this relates to His diagnosis of COPD. He demonstrated Good understanding as evidenced by repeating aspiration precautions. The patients plan of care including findings from [facility], recommendations, and recommended diet changes were discussed with: Registered Nurse. 
[]   Posted safety precautions in patient's room. [x]   Patient/family have participated as able and agree with findings and recommendations. []   Patient is unable to participate in plan of care at this time. Thank you for this referral. 
Ciara Spangler, SLP Time Calculation: 35 mins

## 2018-11-28 VITALS
RESPIRATION RATE: 18 BRPM | SYSTOLIC BLOOD PRESSURE: 162 MMHG | WEIGHT: 136.69 LBS | HEIGHT: 62 IN | TEMPERATURE: 97.9 F | BODY MASS INDEX: 25.15 KG/M2 | DIASTOLIC BLOOD PRESSURE: 90 MMHG | HEART RATE: 91 BPM | OXYGEN SATURATION: 92 %

## 2018-11-28 LAB
ANION GAP SERPL CALC-SCNC: 6 MMOL/L (ref 5–15)
BASOPHILS # BLD: 0 K/UL (ref 0–0.1)
BASOPHILS NFR BLD: 0 % (ref 0–1)
BUN SERPL-MCNC: 10 MG/DL (ref 6–20)
BUN/CREAT SERPL: 13 (ref 12–20)
CALCIUM SERPL-MCNC: 8.6 MG/DL (ref 8.5–10.1)
CHLORIDE SERPL-SCNC: 101 MMOL/L (ref 97–108)
CO2 SERPL-SCNC: 30 MMOL/L (ref 21–32)
CREAT SERPL-MCNC: 0.78 MG/DL (ref 0.7–1.3)
DIFFERENTIAL METHOD BLD: ABNORMAL
EOSINOPHIL # BLD: 0 K/UL (ref 0–0.4)
EOSINOPHIL NFR BLD: 0 % (ref 0–7)
ERYTHROCYTE [DISTWIDTH] IN BLOOD BY AUTOMATED COUNT: 17.4 % (ref 11.5–14.5)
GLUCOSE SERPL-MCNC: 123 MG/DL (ref 65–100)
HCT VFR BLD AUTO: 42.4 % (ref 36.6–50.3)
HGB BLD-MCNC: 13.8 G/DL (ref 12.1–17)
IMM GRANULOCYTES # BLD: 0.1 K/UL (ref 0–0.04)
IMM GRANULOCYTES NFR BLD AUTO: 1 % (ref 0–0.5)
LYMPHOCYTES # BLD: 1.1 K/UL (ref 0.8–3.5)
LYMPHOCYTES NFR BLD: 10 % (ref 12–49)
MCH RBC QN AUTO: 27 PG (ref 26–34)
MCHC RBC AUTO-ENTMCNC: 32.5 G/DL (ref 30–36.5)
MCV RBC AUTO: 83 FL (ref 80–99)
MONOCYTES # BLD: 0.8 K/UL (ref 0–1)
MONOCYTES NFR BLD: 7 % (ref 5–13)
NEUTS SEG # BLD: 8.8 K/UL (ref 1.8–8)
NEUTS SEG NFR BLD: 82 % (ref 32–75)
NRBC # BLD: 0 K/UL (ref 0–0.01)
NRBC BLD-RTO: 0 PER 100 WBC
PLATELET # BLD AUTO: 357 K/UL (ref 150–400)
PMV BLD AUTO: 9.6 FL (ref 8.9–12.9)
POTASSIUM SERPL-SCNC: 4.1 MMOL/L (ref 3.5–5.1)
RBC # BLD AUTO: 5.11 M/UL (ref 4.1–5.7)
SODIUM SERPL-SCNC: 137 MMOL/L (ref 136–145)
WBC # BLD AUTO: 10.8 K/UL (ref 4.1–11.1)

## 2018-11-28 PROCEDURE — 74011000258 HC RX REV CODE- 258: Performed by: HOSPITALIST

## 2018-11-28 PROCEDURE — 80048 BASIC METABOLIC PNL TOTAL CA: CPT

## 2018-11-28 PROCEDURE — C9113 INJ PANTOPRAZOLE SODIUM, VIA: HCPCS | Performed by: HOSPITALIST

## 2018-11-28 PROCEDURE — 74011636637 HC RX REV CODE- 636/637: Performed by: INTERNAL MEDICINE

## 2018-11-28 PROCEDURE — 85025 COMPLETE CBC W/AUTO DIFF WBC: CPT

## 2018-11-28 PROCEDURE — 94640 AIRWAY INHALATION TREATMENT: CPT

## 2018-11-28 PROCEDURE — 74011250636 HC RX REV CODE- 250/636: Performed by: HOSPITALIST

## 2018-11-28 PROCEDURE — 94760 N-INVAS EAR/PLS OXIMETRY 1: CPT

## 2018-11-28 PROCEDURE — 77010033678 HC OXYGEN DAILY

## 2018-11-28 PROCEDURE — 74011250637 HC RX REV CODE- 250/637: Performed by: HOSPITALIST

## 2018-11-28 PROCEDURE — 74011000250 HC RX REV CODE- 250: Performed by: HOSPITALIST

## 2018-11-28 PROCEDURE — 36415 COLL VENOUS BLD VENIPUNCTURE: CPT

## 2018-11-28 RX ORDER — POLYETHYLENE GLYCOL 3350 17 G/17G
17 POWDER, FOR SOLUTION ORAL DAILY
Qty: 30 G | Refills: 3 | Status: SHIPPED
Start: 2018-11-29

## 2018-11-28 RX ORDER — AMLODIPINE BESYLATE 5 MG/1
5 TABLET ORAL DAILY
Qty: 30 TAB | Refills: 6 | Status: SHIPPED | OUTPATIENT
Start: 2018-11-29

## 2018-11-28 RX ORDER — IPRATROPIUM BROMIDE AND ALBUTEROL SULFATE 2.5; .5 MG/3ML; MG/3ML
3 SOLUTION RESPIRATORY (INHALATION)
Status: DISCONTINUED | OUTPATIENT
Start: 2018-11-28 | End: 2018-11-28 | Stop reason: HOSPADM

## 2018-11-28 RX ORDER — ALBUTEROL SULFATE 0.83 MG/ML
2.5 SOLUTION RESPIRATORY (INHALATION)
Qty: 48 EACH | Refills: 0 | Status: SHIPPED | OUTPATIENT
Start: 2018-11-28

## 2018-11-28 RX ADMIN — AZITHROMYCIN MONOHYDRATE 500 MG: 500 INJECTION, POWDER, LYOPHILIZED, FOR SOLUTION INTRAVENOUS at 11:29

## 2018-11-28 RX ADMIN — AMLODIPINE BESYLATE 5 MG: 5 TABLET ORAL at 10:03

## 2018-11-28 RX ADMIN — SODIUM CHLORIDE 40 MG: 9 INJECTION, SOLUTION INTRAMUSCULAR; INTRAVENOUS; SUBCUTANEOUS at 10:04

## 2018-11-28 RX ADMIN — IPRATROPIUM BROMIDE AND ALBUTEROL SULFATE 3 ML: .5; 3 SOLUTION RESPIRATORY (INHALATION) at 04:40

## 2018-11-28 RX ADMIN — IPRATROPIUM BROMIDE AND ALBUTEROL SULFATE 3 ML: .5; 3 SOLUTION RESPIRATORY (INHALATION) at 00:10

## 2018-11-28 RX ADMIN — PREDNISONE 40 MG: 20 TABLET ORAL at 10:02

## 2018-11-28 RX ADMIN — PRIMIDONE 1000 MG: 250 TABLET ORAL at 10:02

## 2018-11-28 RX ADMIN — MONTELUKAST SODIUM 10 MG: 10 TABLET, COATED ORAL at 10:03

## 2018-11-28 RX ADMIN — CEFTRIAXONE 1 G: 1 INJECTION, POWDER, FOR SOLUTION INTRAMUSCULAR; INTRAVENOUS at 10:03

## 2018-11-28 NOTE — PROGRESS NOTES
General Surgery End of Shift Nursing Note Bedside shift change report given to [employer] (oncoming nurse) by Quyen Bermudez RN (offgoing nurse). Report included the following information SBAR, Kardex, Intake/Output and Recent Results. Shift worked:   7p-7a Summary of shift:    Patient rested well this night,   
Issues for physician to address:   none Number times ambulated in hallway past shift: 2 Number of times OOB to chair past shift: 0 Pain Management: 
Current medication: no complaints of pain Patient states pain is manageable on current pain medication: YES 
 
GI: 
 
Current diet:  No diet orders on file Tolerating current diet: YES Passing flatus: YES Last Bowel Movement: today Respiratory: 
 
Incentive Spirometer at bedside: YES Patient instructed on use: YES Patient Safety: 
 
Falls Score: 1 Bed Alarm On? No 
Sitter? No 
 
Adelaida Pascal

## 2018-11-28 NOTE — PROGRESS NOTES
Reason for Admission:   COPD exacerbation RRAT Score:     16 Do you (patient/family) have any concerns for transition/discharge?     none Plan for utilizing home health:     Pt in independent with ADL's and IADL's, active, driving prior to admission. Pt has not used HH, SNF/rehab in past. DME at home is the [employer], stand up and portable purchased 5 years ago. Nebulizer, needs script for aerosols, Dr. Kristine Solis aware. No recommendations for services, pt does not feel needed. [facility] to Home visit and pt declined. States [employer] offers too and he declines. Wife is an RN, he does not feel necessary. Likelihood of readmission?   moderate Transition of Care Plan:      Patient is a 71year old male. CM met with patient, introduced self and role. Pt is alert/oriented and  In no acute distress. Demographics verified. Preferred pharmacy is [facility] in and [place] on [address]. Pt is current with PCP, last seen last Tuesday and has appt for after new year. Pt is also current with Pulmonary MD. 
 
Pt lives with wife and THALIA in 1 story home with 5 steps into entrance. Wife on way to transport pt to home today at discharge, pt calling to bring portable tank and pt will drive self to follow up appt. Discussed discharge plan, follow up appt and no services. Pt is agreeable to plan. Follow up appt on AV[facility]. No further needs from CM. Care Management Interventions PCP Verified by CM: Yes(sees dr Leola Chan, saw last tuesday, due to see again after new year) Mode of Transport at Discharge: Other (see comment)(wife is on way to transport home for discharge) Transition of Care Consult (CM Consult): Discharge Planning Discharge Durable Medical Equipment: No([employer]gen stand up and portable purchased 5 years ago, nebulizer) Physical Therapy Consult: No 
Occupational Therapy Consult: No 
Speech Therapy Consult: No 
 Current Support Network: Own Home, Lives with Spouse(lives with wife and THALIA in a 1 story home with 5 steps into entrance) Confirm Follow Up Transport: Self(pt will transport self to follow up appt) Plan discussed with Pt/Family/Caregiver: Yes Discharge Location Discharge Placement: Home MARVIN KruseN, RN Care Manager [facility] 
[phone]

## 2018-11-28 NOTE — DISCHARGE INSTRUCTIONS
Patient Discharge Instructions    Marcos Escobar / [id number] : 1945    Admitted 2018 Discharged: 2018         DISCHARGE DIAGNOSIS:     Pneumonia    COPD exacerbation ([address] (2018)     Low Sodium due to HCTZ, stop for now      What to do at Home    1. Recommended diet: {diet:75428}    2. Recommended activity: {discharge activity:50328}    3. If you experience any of the following symptoms then please call your primary care physician or return to the emergency room if you cannot get hold of your doctor:   Fevers > 100.5, chills   Nausea or vomiting, persistent diarrhea > 24 hours   Blood in stool or black stools   Chest pain or SOB      Follow-up Information     Follow up With Specialties Details Why Contact Info    Ashanti Nix MD [facility] Go on 2018 For hospital follow up appointment at 10:45AM  [address]  [address] [place] [address]      Filomena Galeana MD Pulmonary Disease Schedule an appointment as soon as possible for a visit post hospital follow up [address] [facility]  [facility]  [address] [address]  [phone]          HCTZ made your sodium low, stop the HCTAZ and use the norvasc for your blood pressure      Information obtained by :  I understand that if any problems occur once I am at home I am to contact my physician. I understand and acknowledge receipt of the instructions indicated above.                                                                                                                                            Physician's or R.N.'s Signature                                                                  Date/Time                                                                                                                                              Patient or Representative Signature                                                          Date/Time

## 2018-11-28 NOTE — DISCHARGE SUMMARY
Hospitalist Discharge Note    NAME: Harley Aschoff   :  1945   MRN:  [id number]     Admit date: 2018    Discharge date: 18    PCP: Wolfgang Verdin MD    Discharge Diagnoses:    Acute on chronic hypoxic respiratory failure POA    Acute COPD exacerbation POA    Right lower lobe community-acquired pneumonia POA    Sepsis POA resolved     History of primary pumonary hypertension     Hyponatremia now off HCTZ POA    Essential HTN  POA    GERD    Hiatal hernia POA    Incidental esophagitis POA    Essential tremors, on primidone    Hypokalemia     Hyperlipidemia POA    Code status: DNR     Discharge Medications:  Current Discharge Medication List      START taking these medications    Details   polyethylene glycol (MIRALAX) 17 gram packet Take 1 Packet by mouth daily. Qty: 30 g, Refills: 3      amLODIPine (NORVASC) 5 mg tablet Take 1 Tab by mouth daily. Qty: 30 Tab, Refills: 6      albuterol (PROVENTIL VENTOLIN) 2.5 mg /3 mL (0.083 %) nebulizer solution 3 mL by Nebulization route every four (4) hours as needed for Wheezing. Qty: 48 Each, Refills: 0         CONTINUE these medications which have NOT CHANGED    Details   fluticasone/umeclidin/vilanter (TRELEGY ELLIPTA IN) Take  by inhalation. pravastatin (PRAVACHOL) 40 mg tablet Take  by mouth. GUAIFENESIN/DEXTROMETHORPHAN (MUCINEX DM PO) Take  by mouth two (2) times a day. primidone (MYSOLINE) 250 mg tablet Take 1,000 mg by mouth daily. montelukast (SINGULAIR) 10 mg tablet Take 10 mg by mouth daily. STOP taking these medications       hydroCHLOROthiazide (HYDRODIURIL) 12.5 mg tablet Comments:   Reason for Stopping:         hydroCHLOROthiazide (HYDRODIURIL) 12.5 mg tablet Comments:   Reason for Stopping:                Follow-up Information     Follow up With Specialties Details Why Contact Info    Wolfgang Verdin MD Family Practice Go on 2018 For hospital follow up appointment at 10:45AM  [address] [place] [address] [address] [facility]  [phone]      Fredrick Mahoney MD Pulmonary Disease Schedule an appointment as soon as possible for a visit post hospital follow up [address] [facility]  [address] [facility]  [address]  [phone]            Time spent on discharge:   I spent greater than 30 minutes on discharge, seeing and examining the patient, reconciling home meds and new meds, coordinating care with case management, doing the discharge papers and the D/C summary    Discharge disposition: home    Discharge Condition: Stable    Summary of admission H+P(copied from Dr Elder Lesches Note):     CHIEF COMPLAINT: cough     HISTORY OF PRESENT ILLNESS:     Elkin Batista is a 71 y.o. male who with history of COPD on home oxygen 2 litres at night and as needed during the day, follows with Dr. Diogo Brar   History of pulmonary hypertension, pt initially presented to PCP on Monday for the salivary gland enlargment, was given amox, during that visit, pt had exposed to sick contacts, and since Wednesday, started with cough and shortness of breath, low grade fevers  Continue to get worse, with noted hemoptysis yesterday, and wheezing, presented to the ER  Denies nausea, constipation, no urinary or bowel complaints  We were asked to admit for work up and evaluation of the above problems.           Past Medical History:   Diagnosis Date    CAD (coronary artery disease)      Calculus of kidney      COPD       sees  dr. Diogo Brar at [facility].  Hypertension      Ill-defined condition       oxygen as needed--2 liters    Ill-defined condition       tremors in thumb, rt    Inguinal hernia       right    Primary pulmonary HTN (HCC)      pCXR FINDINGS:   AP radiograph of the chest was obtained.   Chronic emphysematous changes. Heterogeneous opacities at the right lung base. No pleural effusion or pneumothorax. Heart, annette, mediastinum are within normal  limits. No acute osseous abnormalities. IMPRESSION:   1. Heterogeneous opacities at the right lung base, which may represent  atelectasis or infection in the appropriate clinical context. 2. Chronic emphysematous changes. CTA chest FINDINGS:  LOWER NECK: The visualized portions of the thyroid and structures of the lower  neck are within normal limits. LUNGS: There are changes of emphysema throughout the lungs with airspace disease  in the right lower lobe. PLEURA: There is no pleural effusion or pneumothorax. PULMONARY ARTERIES: The pulmonary arteries are well enhanced and no pulmonary  emboli are identified. AORTA: The aorta enhances normally without evidence of aneurysm or dissection. MEDIASTINUM: There is no mediastinal or hilar adenopathy or mass. Is a hiatal  hernia in the distal esophagus is thickened. BONES AND SOFT TISSUES: The bones and soft tissues of the chest wall are within  normal limits. UPPER ABDOMEN: The visualized portions of the upper abdominal organs are normal.  IMPRESSION:   1. No pulmonary embolus. 2. Emphysema. 3. Right lower lobe airspace disease. 4. Hiatal hernia with thickened esophagus suggesting esophagitis.       Hospital course:     Acute on chronic hypoxic respiratory failure due to acute COPD exacerbation due to CAP, POA  Sepsis POA resolved   Baseline home O2 2 L at night  History of primary pumonary hypertension   - clinically improved, feels ready to go home, breathing feels at baseline  - Leukocytosis resolved   - stop steroids, nebs at home  - AB: empiric CTX + zithromax, completed course in hospital  - PTA pt is is on trelegy inhaler ( combination of LABA, LAMA and steroid)       Continue advair and incruse here   - O2 to keep rosie >90%, wean as tolerated back to home level   - CTA chest       1. No PE        2. Emphysema.         3. Right lower lobe airspace disease.  - primary pulmonologist: Dr Eloy Valderrama     Hyponatremia  - better 129--> 137, Stop IVF  - Holding hctz     Essential HTN  POA  - BP stable, mildly increased with prednisone  - dc hctz for hyponatremia   - Norvasc this admission     GERD/ hiatal hernia/ incidental esophagitis  - accidental finding by CT  -  IV Protonix  - Back on medications  - Dr Gia Soriano saw, outpatient EGD  - CT: Hiatal hernia with thickened esophagus suggesting esophagitis. Essential tremors, on primidone  Hypokalemia   Hyperlipidemia, cont statin     Code status: DNR   Surrogate Decision Maker: wife maggie moralez [phone]  DVT Prophylaxis:h/h stable , start lovenox for dvt prophylaxis     Baseline: , lives with wife, independent   Recommended Disposition: Home w/Family 2-4 days      Subjective:     Chief Complaint / Reason for Physician Visit: following copd exacerbation / pneumonia / hyponatremia    \"I feel ready to go home\"  Breathing comfortable, feels at baseline  Tolerating diet  Discussed with RN events overnight. Review of Systems:  Symptom Y/N Comments  Symptom Y/N Comments   Fever/Chills n   Chest Pain n    Poor Appetite    Edema     Cough less   Abdominal Pain n    Sputum    Joint Pain     SOB/RYAN less   Pruritis/Rash     Nausea/vomit n   Tolerating PT/OT     Diarrhea n   Tolerating Diet y    Constipation    Other       Could NOT obtain due to:      Objective:     VITALS:   Last 24hrs VS reviewed since prior progress note.  Most recent are:  Patient Vitals for the past 24 hrs:   Temp Pulse Resp BP SpO2   11/28/18 0924    168/89    11/28/18 0725 97.7 °F (36.5 °C) 90 18 (!) 176/99 98 %   11/28/18 0450     96 %   11/28/18 0440     96 %   11/28/18 0340 97.6 °F (36.4 °C) 78 20 (!) 169/95 97 %   11/28/18 0032 97.9 °F (36.6 °C) 77 16 142/88 97 %   11/28/18 0020     96 %   11/28/18 0010     95 %   11/27/18 1915 98.7 °F (37.1 °C) 84 18 143/82 96 %   11/27/18 1615 98.3 °F (36.8 °C) 100 18 (!) 153/94 95 %   11/27/18 1513     91 %   11/27/18 1141 97.7 °F (36.5 °C) (!) 102 17 154/84 95 %   11/27/18 1116     93 %       Intake/Output Summary (Last 24 hours) at 11/28/2018 1043  Last data filed at 11/28/2018 0650  Gross per 24 hour   Intake 833.75 ml   Output 1900 ml   Net -1066.25 ml        PHYSICAL EXAM:  General: WD, WN. Alert, cooperative, no acute distress, mild dyspnea with conversation   EENT:  EOMI. Anicteric sclerae. MMM  Resp:  Decreased air entry bilaterally, no wheezing, no rales. No accessory muscle use  CV:  Regular  rhythm,  No edema  GI:  Soft, Non distended, Non tender.  +Bowel sounds  Neurologic:  Alert and oriented X 3, normal speech,   Psych:   Good insight. Not anxious nor agitated  Skin:  No rashes. No jaundice    Reviewed most current lab test results and cultures  YES  Reviewed most current radiology test results   YES  Review and summation of old records today    NO  Reviewed patient's current orders and MAR    YES  PMH/SH reviewed - no change compared to H&P  ________________________________________________________________________  Care Plan discussed with:    Comments   Patient y    Family      RN y    Care Manager y    Consultant                        Multidiciplinary team rounds were held today with , nursing, pharmacist and clinical coordinator. Patient's plan of care was discussed; medications were reviewed and discharge planning was addressed. ________________________________________________________________________      Comments   >50% of visit spent in counseling and coordination of care     ________________________________________________________________________  Nadege Alves MD     Procedures: see electronic medical records for all procedures/Xrays and details which were not copied into this note but were reviewed prior to creation of Plan. LABS:  I reviewed today's most current labs and imaging studies.   Pertinent labs include:  Recent Labs     11/28/18 0345 11/26/18  0446   WBC 10.8 10.8   HGB 13.8 12.3   HCT 42.4 38.2    313     Recent Labs     11/28/18 0345 11/27/18  0414 11/26/18  0446   NA 137 137 135*   K 4.1 4.2 3.9    103 103   CO2 30 29 27   * 119* 113*   BUN 10 11 9   CREA 0.78 0.59* 0.73   CA 8.6 8.1* 8.1*   MG  --   --  2.4   PHOS  --   --  2.7       Signed: Pantera Andrade MD

## 2018-11-28 NOTE — PROGRESS NOTES
I have reviewed discharge instructions with the patient. The patient verbalized understanding. Follow up appointments reviewed with patient. IV removed and pt discharged with belongings. Pt has no questions at this time.

## 2018-11-28 NOTE — PROGRESS NOTES
Physical Therapy Re-consult received and chart reviewed. Patient up ad shobha in room and reports ambulating in halls with wife. Patient with 15 yr history of COPD and has been to pulmonary rehab in the past. He reports good understand and compliance with energy conservation techniques, activity pacing, and pursed lip breathing. Patient has no PT needs at this time. Will sign off.  
Jaiden Ahn, PT

## 2018-11-28 NOTE — PROGRESS NOTES
Gastroenterology Progress Note 
 
11/28/2018 Admit Date: 11/24/2018 Subjective: Follow up for: GERD,esophagitis Had MBS done. Patient was seen in rounds by me today. Current Facility-Administered Medications Medication Dose Route Frequency  albuterol-ipratropium (DUO-NEB) 2.5 MG-0.5 MG/3 ML  3 mL Nebulization BID RT  
 predniSONE (DELTASONE) tablet 40 mg  40 mg Oral DAILY WITH BREAKFAST  azithromycin (ZITHROMAX) 500 mg in 0.9% sodium chloride 250 mL (Mnvb4Dot)  500 mg IntraVENous Q24H  
 benzonatate (TESSALON) capsule 100 mg  100 mg Oral TID PRN  
 senna-docusate (PERICOLACE) 8.6-50 mg per tablet 2 Tab  2 Tab Oral QHS  polyethylene glycol (MIRALAX) packet 17 g  17 g Oral DAILY  fluticasone-umeclidin-vilanter 100-62.5-25 mcg dsdv 1 Puff (Patient Supplied)  1 Puff Inhalation DAILY  sodium chloride (NS) flush 5-10 mL  5-10 mL IntraVENous PRN  
 sodium chloride (NS) flush 5-10 mL  5-10 mL IntraVENous Q8H  
 sodium chloride (NS) flush 5-10 mL  5-10 mL IntraVENous PRN  
 acetaminophen (TYLENOL) tablet 650 mg  650 mg Oral Q4H PRN  
 ondansetron (ZOFRAN) injection 4 mg  4 mg IntraVENous Q4H PRN  
 bisacodyl (DULCOLAX) tablet 5 mg  5 mg Oral DAILY PRN  
 montelukast (SINGULAIR) tablet 10 mg  10 mg Oral DAILY  pravastatin (PRAVACHOL) tablet 40 mg  40 mg Oral QHS  primidone (MYSOLINE) tablet 1,000 mg  1,000 mg Oral DAILY  cefTRIAXone (ROCEPHIN) 1 g in 0.9% sodium chloride (MBP/ADV) 50 mL  1 g IntraVENous Q24H  hydrALAZINE (APRESOLINE) 20 mg/mL injection 10 mg  10 mg IntraVENous Q6H PRN  
 amLODIPine (NORVASC) tablet 5 mg  5 mg Oral DAILY  pantoprazole (PROTONIX) 40 mg in sodium chloride 0.9% 10 mL injection  40 mg IntraVENous DAILY Objective:  
 
Blood pressure (!) 176/99, pulse 90, temperature 97.7 °F (36.5 °C), resp. rate 18, height 5' 2\" (1.575 m), weight 62 kg (136 lb 11 oz), SpO2 98 %. No intake/output data recorded. 11/26 1901 - 11/28 0700 In: 2132.5 [P.O.:240; I.V.:1892.5] Out: Wu Bowens [[id number]] Physical Examination:  
 
 
General:AAO x 3, SOB,pleasant HEENT:  EOMI, Chest:  wheezes Heart: S1, S2, RRR 
GI: Soft, NT, ND + bowel sounds Data Review Recent Results (from the past 24 hour(s)) CBC WITH AUTOMATED DIFF Collection Time: 11/28/18  3:45 AM  
Result Value Ref Range WBC 10.8 4.1 - 11.1 K/uL  
 RBC 5.11 4.10 - 5.70 M/uL  
 HGB 13.8 12.1 - 17.0 g/dL HCT 42.4 36.6 - 50.3 % MCV 83.0 80.0 - 99.0 FL  
 MCH 27.0 26.0 - 34.0 PG  
 MCHC 32.5 30.0 - 36.5 g/dL  
 RDW 17.4 (H) 11.5 - 14.5 % PLATELET 290 556 - 618 K/uL MPV 9.6 8.9 - 12.9 FL  
 NRBC 0.0 0  WBC ABSOLUTE NRBC 0.00 0.00 - 0.01 K/uL NEUTROPHILS 82 (H) 32 - 75 % LYMPHOCYTES 10 (L) 12 - 49 % MONOCYTES 7 5 - 13 % EOSINOPHILS 0 0 - 7 % BASOPHILS 0 0 - 1 % IMMATURE GRANULOCYTES 1 (H) 0.0 - 0.5 % ABS. NEUTROPHILS 8.8 (H) 1.8 - 8.0 K/UL  
 ABS. LYMPHOCYTES 1.1 0.8 - 3.5 K/UL  
 ABS. MONOCYTES 0.8 0.0 - 1.0 K/UL  
 ABS. EOSINOPHILS 0.0 0.0 - 0.4 K/UL  
 ABS. BASOPHILS 0.0 0.0 - 0.1 K/UL  
 ABS. IMM. GRANS. 0.1 (H) 0.00 - 0.04 K/UL  
 DF AUTOMATED METABOLIC PANEL, BASIC Collection Time: 11/28/18  3:45 AM  
Result Value Ref Range Sodium 137 136 - 145 mmol/L Potassium 4.1 3.5 - 5.1 mmol/L Chloride 101 97 - 108 mmol/L  
 CO2 30 21 - 32 mmol/L Anion gap 6 5 - 15 mmol/L Glucose 123 (H) 65 - 100 mg/dL BUN 10 6 - 20 MG/DL Creatinine 0.78 0.70 - 1.30 MG/DL  
 BUN/Creatinine ratio 13 12 - 20 GFR est AA >60 >60 ml/min/1.73m2 GFR est non-AA >60 >60 ml/min/1.73m2 Calcium 8.6 8.5 - 10.1 MG/DL Recent Labs  
  11/28/18 
0345 11/26/18 
0446 WBC 10.8 10.8 HGB 13.8 12.3 HCT 42.4 38.2  313 Recent Labs  
  11/28/18 
0345 11/27/18 
0414 11/26/18 
0446  137 135* K 4.1 4.2 3.9  103 103 CO2 30 29 27 BUN 10 11 9 CREA 0.78 0.59* 0.73 * 119* 113* CA 8.6 8.1* 8.1*  
MG  --   --  2.4 PHOS  --   --  2.7 No results for input(s): SGOT, GPT, AP, TBIL, TP, ALB, GLOB, GGT, AML, LPSE in the last 72 hours. No lab exists for component: AMYP, HLPSE No results for input(s): INR, PTP, APTT in the last 72 hours. No lab exists for component: INREXT, INREXT No results for input(s): FE, TIBC, PSAT, FERR in the last 72 hours. No results found for: FOL, RBCF No results for input(s): PH, PCO2, PO2 in the last 72 hours. No results for input(s): CPK, CKNDX, TROIQ in the last 72 hours. No lab exists for component: CPKMB Lab Results Component Value Date/Time Cholesterol, total 162 04/12/2016 03:56 AM  
 HDL Cholesterol 20 04/12/2016 03:56 AM  
 LDL, calculated 104 (H) 04/12/2016 03:56 AM  
 Triglyceride 190 (H) 04/12/2016 03:56 AM  
 CHOL/HDL Ratio 8.1 (H) 04/12/2016 03:56 AM  
 
No components found for: [place] Lab Results Component Value Date/Time Color YELLOW/STRAW 11/24/2018 11:49 AM  
 Appearance CLEAR 11/24/2018 11:49 AM  
 Specific gravity 1.020 11/24/2018 11:49 AM  
 pH (UA) 7.0 11/24/2018 11:49 AM  
 Protein NEGATIVE  11/24/2018 11:49 AM  
 Glucose NEGATIVE  11/24/2018 11:49 AM  
 Ketone NEGATIVE  11/24/2018 11:49 AM  
 Bilirubin NEGATIVE  11/24/2018 11:49 AM  
 Urobilinogen 0.2 11/24/2018 11:49 AM  
 Nitrites NEGATIVE  11/24/2018 11:49 AM  
 Leukocyte Esterase NEGATIVE  11/24/2018 11:49 AM  
 Epithelial cells FEW 11/24/2018 11:49 AM  
 Bacteria NEGATIVE  11/24/2018 11:49 AM  
 WBC 0-4 11/24/2018 11:49 AM  
 RBC 0-5 11/24/2018 11:49 AM  
 
  
ROS: -CP, SOB, Dysuria, palpitations, cough. Assessment: 
GERD, [facility] Esophagitis Active Problems: 
  HTN (hypertension) (4/12/2016) COPD exacerbation ([facility] [address] (11/24/2018) Plan/Discussion: 1. Not plan for EGD with sedation currently. We will plan on EGD as OP(I will arrange).  Treat GERD/esophagitis with PPI and GERD lifestyle changes/diet, 
 2. MBS showed mild to mod pharyngeal dysphagia present. Flash trace laryngeal penetration with thins that cleared with the swallow. SLP recommend: 
Reg/thins, Single sips May want to forgo straws. He tolerated a diet without issues. 3. Rest as per IM. Signed By: Anderson Burk MD 
 
11/28/2018  804 am

## 2018-11-28 NOTE — PROGRESS NOTES
Spiritual Care Partner Volunteer visited patient in Gen Surg on November 28, 2018. Documented by: 
BHAVIK Alexander, [facility],  [facility] [place]aging Service  [phone])

## 2018-11-28 NOTE — PROGRESS NOTES
Hospitalist Progress Note NAME: Chelsea Landeros :  1945 MRN:  [id number] Assessment / Plan: 
Acute on chronic hypoxic respiratory failure due to acute COPD exacerbation due to CAP, POA Sepsis POA resolved Baseline home O2 2 L at night History of primary pumonary hypertension  
-clinically improved today, less SOB and much less wheezing 
-Leukocytosis resolved  
-cont steroids to prednisone and jet nebs  
-AB: empiric CTX + zithromax, complete course  
-PTA pt is is on trelegy inhaler ( combination of LABA, LAMA and steroid) Continue advair and incruse here  
-O2 to keep rosie >90%, wean as tolerated back to home level  
-CTA chest 
     1. No PE  
     2. Emphysema. 3. Right lower lobe airspace disease. 
-primary pulmonologist: Dr Mami Nguyen  
- ambulate, possible discharge in AM if stable Hyponatremia 
-better 129--> 135, Stop IVF 
- Holding hctz Essential HTN  POA 
-BP stable 
-dc hctz for hyponatremia - Norvasc this admission GERD/ hiatal hernia/ incidenatal esophagitis 
-accidental finding by CT 
- iv Protonix 
-speech: MBS in am  
-Dr Rock Newman saw, outpatient EGD 
-CT: Hiatal hernia with thickened esophagus suggesting esophagitis. Essential tremors, on primidone Hypokalemia Hyperlipidemia, cont statin Code status: DNR Surrogate Decision Maker: wife Darlene Padilla [phone] DVT Prophylaxis:h/h stable , start lovenox for dvt prophylaxis Baseline: , lives with wife, independent Recommended Disposition: Home w/Family 2-4 days Subjective: Chief Complaint / Reason for Physician Visit: following copd exacerbation / pneumonia / hyponatremia \"My breathing is getting back to baseline\" Breathing better, less SOB and less cough Passed MBS, started on diet Discussed with RN events overnight. Review of Systems: 
Symptom Y/N Comments  Symptom Y/N Comments Fever/Chills n   Chest Pain n   
Poor Appetite    Edema Cough less   Abdominal Pain n   
Sputum    Joint Pain SOB/RYAN less   Pruritis/Rash Nausea/vomit n   Tolerating PT/OT Diarrhea n   Tolerating Diet y Constipation    Other Could NOT obtain due to:   
 
Objective: VITALS:  
Last 24hrs VS reviewed since prior progress note. Most recent are: 
Patient Vitals for the past 24 hrs: 
 Temp Pulse Resp BP SpO2  
11/27/18 1915 98.7 °F (37.1 °C) 84 18 143/82 96 % 11/27/18 1615 98.3 °F (36.8 °C) 100 18 (!) 153/94 95 % 11/27/18 1513     91 % 11/27/18 1141 97.7 °F (36.5 °C) (!) 102 17 154/84 95 % 11/27/18 1116     93 % 11/27/18 0800 97.6 °F (36.4 °C) 82 18 153/79 97 % 11/27/18 0744     93 % 11/27/18 0520 97.9 °F (36.6 °C) 72 17 148/85 97 % 11/27/18 0411     96 % 11/27/18 0021 98.1 °F (36.7 °C) 77 16 132/79 93 % 11/26/18 2333     93 % Intake/Output Summary (Last 24 hours) at 11/27/2018 2218 Last data filed at 11/27/2018 6778 Gross per 24 hour Intake 2132.5 ml Output 2400 ml Net -267.5 ml PHYSICAL EXAM: 
General: WD, WN. Alert, cooperative, no acute distress, mild dyspnea with conversation  
EENT:  EOMI. Anicteric sclerae. MMM Resp:  Decreased air entry bilaterally, few scattered wheezes, no rales. No accessory muscle use CV:  Regular  rhythm,  No edema GI:  Soft, Non distended, Non tender.  +Bowel sounds Neurologic:  Alert and oriented X 3, normal speech, Psych:   Good insight. Not anxious nor agitated Skin:  No rashes. No jaundice Reviewed most current lab test results and cultures  YES Reviewed most current radiology test results   YES Review and summation of old records today    NO Reviewed patient's current orders and MAR    YES 
PMH/SH reviewed - no change compared to H&P 
________________________________________________________________________ Care Plan discussed with: 
  Comments Patient y Family RN y   
Care Manager Consultant Multidiciplinary team rounds were held today with , nursing, pharmacist and clinical coordinator. Patient's plan of care was discussed; medications were reviewed and discharge planning was addressed. ________________________________________________________________________ Total NON critical care TIME:  25   Minutes Total CRITICAL CARE TIME Spent:   Minutes non procedure based Comments >50% of visit spent in counseling and coordination of care    
________________________________________________________________________ Parveen Renteria MD  
 
Procedures: see electronic medical records for all procedures/Xrays and details which were not copied into this note but were reviewed prior to creation of Plan. LABS: 
I reviewed today's most current labs and imaging studies. Pertinent labs include: 
Recent Labs  
  11/26/18 
0446 11/25/18 
0433 WBC 10.8 11.2* HGB 12.3 12.9 HCT 38.2 39.1  298 Recent Labs  
  11/27/18 
0414 11/26/18 
0446 11/25/18 
9193  135* 134* K 4.2 3.9 3.5  103 98 CO2 29 27 30 * 113* 119* BUN 11 9 10 CREA 0.59* 0.73 0.67* CA 8.1* 8.1* 8.1*  
MG  --  2.4  --   
PHOS  --  2.7  --   
 
 
Signed: Parveen Renteria MD

## 2018-11-28 NOTE — PROGRESS NOTES
Bedside and Verbal shift change report given to KAMINI Olsen rn ([place]) (oncoming nurse) by Genie Fuentes (offgoing nurse). Report included the following information SBAR, Procedure Summary, Intake/Output, MAR, Recent Results and Cardiac Rhythm NSR.

## 2018-11-30 LAB
BACTERIA SPEC CULT: NORMAL
BACTERIA SPEC CULT: NORMAL
SERVICE CMNT-IMP: NORMAL
SERVICE CMNT-IMP: NORMAL

## 2019-01-23 ENCOUNTER — HOSPITAL ENCOUNTER (OUTPATIENT)
Dept: CT IMAGING | Age: 74
Discharge: HOME | End: 2019-01-23
Payer: MEDICARE

## 2019-01-23 DIAGNOSIS — Z87.891 PERSONAL HISTORY OF NICOTINE DEPENDENCE: ICD-10-CM

## 2019-01-23 PROCEDURE — G0297 LDCT FOR LUNG CA SCREEN: HCPCS

## 2019-02-21 ENCOUNTER — HOSPITAL ENCOUNTER (OUTPATIENT)
Dept: CARDIAC REHAB | Age: 74
Discharge: HOME | End: 2019-02-21
Payer: MEDICARE

## 2019-02-21 VITALS — BODY MASS INDEX: 24.55 KG/M2 | WEIGHT: 133.4 LBS | HEIGHT: 62 IN

## 2019-02-21 PROCEDURE — G0424 PULMONARY REHAB W EXER: HCPCS

## 2019-02-28 ENCOUNTER — HOSPITAL ENCOUNTER (OUTPATIENT)
Dept: CARDIAC REHAB | Age: 74
Discharge: HOME | End: 2019-02-28
Payer: MEDICARE

## 2019-02-28 VITALS — WEIGHT: 131.4 LBS | BODY MASS INDEX: 24.03 KG/M2

## 2019-02-28 PROCEDURE — G0424 PULMONARY REHAB W EXER: HCPCS

## 2019-03-05 ENCOUNTER — HOSPITAL ENCOUNTER (OUTPATIENT)
Dept: CARDIAC REHAB | Age: 74
Discharge: HOME | End: 2019-03-05
Payer: MEDICARE

## 2019-03-05 VITALS — BODY MASS INDEX: 24.14 KG/M2 | WEIGHT: 132 LBS

## 2019-03-05 PROCEDURE — G0424 PULMONARY REHAB W EXER: HCPCS

## 2019-03-07 ENCOUNTER — HOSPITAL ENCOUNTER (OUTPATIENT)
Dept: CARDIAC REHAB | Age: 74
Discharge: HOME | End: 2019-03-07
Payer: MEDICARE

## 2019-03-07 VITALS — BODY MASS INDEX: 24 KG/M2 | WEIGHT: 131.2 LBS

## 2019-03-07 PROCEDURE — G0424 PULMONARY REHAB W EXER: HCPCS

## 2019-03-12 ENCOUNTER — HOSPITAL ENCOUNTER (OUTPATIENT)
Dept: CARDIAC REHAB | Age: 74
Discharge: HOME | End: 2019-03-12
Payer: MEDICARE

## 2019-03-12 VITALS — WEIGHT: 131 LBS | BODY MASS INDEX: 23.96 KG/M2

## 2019-03-12 PROCEDURE — G0424 PULMONARY REHAB W EXER: HCPCS

## 2019-03-14 ENCOUNTER — HOSPITAL ENCOUNTER (OUTPATIENT)
Dept: CARDIAC REHAB | Age: 74
Discharge: HOME | End: 2019-03-14
Payer: MEDICARE

## 2019-03-14 VITALS — WEIGHT: 131.8 LBS | BODY MASS INDEX: 24.11 KG/M2

## 2019-03-14 PROCEDURE — G0424 PULMONARY REHAB W EXER: HCPCS

## 2019-03-14 PROCEDURE — 93798 PHYS/QHP OP CAR RHAB W/ECG: CPT

## 2019-03-19 ENCOUNTER — HOSPITAL ENCOUNTER (OUTPATIENT)
Dept: CARDIAC REHAB | Age: 74
Discharge: HOME | End: 2019-03-19
Payer: MEDICARE

## 2019-03-19 VITALS — WEIGHT: 132.4 LBS | BODY MASS INDEX: 24.22 KG/M2

## 2019-03-19 PROCEDURE — G0424 PULMONARY REHAB W EXER: HCPCS

## 2019-03-21 ENCOUNTER — HOSPITAL ENCOUNTER (OUTPATIENT)
Dept: CARDIAC REHAB | Age: 74
Discharge: HOME | End: 2019-03-21
Payer: MEDICARE

## 2019-03-21 VITALS — WEIGHT: 132.6 LBS | BODY MASS INDEX: 24.25 KG/M2

## 2019-03-21 PROCEDURE — G0424 PULMONARY REHAB W EXER: HCPCS

## 2019-03-26 ENCOUNTER — HOSPITAL ENCOUNTER (OUTPATIENT)
Dept: CARDIAC REHAB | Age: 74
Discharge: HOME | End: 2019-03-26
Payer: MEDICARE

## 2019-03-26 VITALS — WEIGHT: 131.6 LBS | BODY MASS INDEX: 24.07 KG/M2

## 2019-03-26 PROCEDURE — G0424 PULMONARY REHAB W EXER: HCPCS

## 2019-03-28 ENCOUNTER — HOSPITAL ENCOUNTER (OUTPATIENT)
Dept: CARDIAC REHAB | Age: 74
Discharge: HOME | End: 2019-03-28
Payer: MEDICARE

## 2019-03-28 VITALS — BODY MASS INDEX: 23.78 KG/M2 | WEIGHT: 130 LBS

## 2019-03-28 PROCEDURE — G0424 PULMONARY REHAB W EXER: HCPCS

## 2019-04-02 ENCOUNTER — HOSPITAL ENCOUNTER (OUTPATIENT)
Dept: CARDIAC REHAB | Age: 74
Discharge: HOME | End: 2019-04-02
Payer: MEDICARE

## 2019-04-02 VITALS — BODY MASS INDEX: 24.58 KG/M2 | WEIGHT: 134.4 LBS

## 2019-04-02 PROCEDURE — G0424 PULMONARY REHAB W EXER: HCPCS

## 2019-04-04 ENCOUNTER — HOSPITAL ENCOUNTER (OUTPATIENT)
Dept: CARDIAC REHAB | Age: 74
Discharge: HOME | End: 2019-04-04
Payer: MEDICARE

## 2019-04-04 VITALS — BODY MASS INDEX: 24.58 KG/M2 | WEIGHT: 134.4 LBS

## 2019-04-04 PROCEDURE — G0424 PULMONARY REHAB W EXER: HCPCS

## 2019-04-09 ENCOUNTER — HOSPITAL ENCOUNTER (OUTPATIENT)
Dept: CARDIAC REHAB | Age: 74
Discharge: HOME | End: 2019-04-09
Payer: MEDICARE

## 2019-04-09 VITALS — BODY MASS INDEX: 24.03 KG/M2 | WEIGHT: 131.4 LBS

## 2019-04-09 PROCEDURE — G0424 PULMONARY REHAB W EXER: HCPCS

## 2019-04-11 ENCOUNTER — HOSPITAL ENCOUNTER (OUTPATIENT)
Dept: CARDIAC REHAB | Age: 74
Discharge: HOME | End: 2019-04-11
Payer: MEDICARE

## 2019-04-11 VITALS — WEIGHT: 130.4 LBS | BODY MASS INDEX: 23.85 KG/M2

## 2019-04-11 PROCEDURE — G0424 PULMONARY REHAB W EXER: HCPCS

## 2019-04-16 ENCOUNTER — HOSPITAL ENCOUNTER (OUTPATIENT)
Dept: CARDIAC REHAB | Age: 74
Discharge: HOME | End: 2019-04-16
Payer: MEDICARE

## 2019-04-16 VITALS — WEIGHT: 132.5 LBS | BODY MASS INDEX: 24.23 KG/M2

## 2019-04-16 PROCEDURE — G0424 PULMONARY REHAB W EXER: HCPCS

## 2019-04-18 ENCOUNTER — HOSPITAL ENCOUNTER (OUTPATIENT)
Dept: CARDIAC REHAB | Age: 74
Discharge: HOME | End: 2019-04-18
Payer: MEDICARE

## 2019-04-18 VITALS — BODY MASS INDEX: 24.73 KG/M2 | WEIGHT: 135.2 LBS

## 2019-04-18 PROCEDURE — G0424 PULMONARY REHAB W EXER: HCPCS

## 2019-04-23 ENCOUNTER — HOSPITAL ENCOUNTER (OUTPATIENT)
Dept: CARDIAC REHAB | Age: 74
Discharge: HOME | End: 2019-04-23
Payer: MEDICARE

## 2019-04-23 VITALS — BODY MASS INDEX: 24.55 KG/M2 | WEIGHT: 134.2 LBS

## 2019-04-23 PROCEDURE — G0424 PULMONARY REHAB W EXER: HCPCS

## 2019-04-25 ENCOUNTER — HOSPITAL ENCOUNTER (OUTPATIENT)
Dept: CARDIAC REHAB | Age: 74
Discharge: HOME | End: 2019-04-25
Payer: MEDICARE

## 2019-04-25 VITALS — BODY MASS INDEX: 24.93 KG/M2 | WEIGHT: 136.3 LBS

## 2019-04-25 PROCEDURE — G0424 PULMONARY REHAB W EXER: HCPCS

## 2019-04-30 ENCOUNTER — HOSPITAL ENCOUNTER (OUTPATIENT)
Dept: CARDIAC REHAB | Age: 74
Discharge: HOME | End: 2019-04-30
Payer: MEDICARE

## 2019-04-30 VITALS — BODY MASS INDEX: 23.85 KG/M2 | WEIGHT: 130.4 LBS

## 2019-04-30 PROCEDURE — G0424 PULMONARY REHAB W EXER: HCPCS

## 2019-05-02 ENCOUNTER — HOSPITAL ENCOUNTER (OUTPATIENT)
Dept: CARDIAC REHAB | Age: 74
Discharge: HOME | End: 2019-05-02
Payer: MEDICARE

## 2019-05-02 VITALS — WEIGHT: 130.2 LBS | BODY MASS INDEX: 23.81 KG/M2

## 2019-05-02 PROCEDURE — G0424 PULMONARY REHAB W EXER: HCPCS

## 2019-05-07 ENCOUNTER — HOSPITAL ENCOUNTER (OUTPATIENT)
Dept: CARDIAC REHAB | Age: 74
Discharge: HOME | End: 2019-05-07
Payer: MEDICARE

## 2019-05-07 VITALS — BODY MASS INDEX: 23.85 KG/M2 | WEIGHT: 130.4 LBS

## 2019-05-07 PROCEDURE — G0424 PULMONARY REHAB W EXER: HCPCS

## 2019-05-09 ENCOUNTER — HOSPITAL ENCOUNTER (OUTPATIENT)
Dept: CARDIAC REHAB | Age: 74
Discharge: HOME | End: 2019-05-09
Payer: MEDICARE

## 2019-05-09 VITALS — WEIGHT: 131.4 LBS | BODY MASS INDEX: 24.03 KG/M2

## 2019-05-09 PROCEDURE — G0424 PULMONARY REHAB W EXER: HCPCS

## 2019-05-14 ENCOUNTER — HOSPITAL ENCOUNTER (OUTPATIENT)
Dept: CARDIAC REHAB | Age: 74
Discharge: HOME | End: 2019-05-14
Payer: MEDICARE

## 2019-05-14 VITALS — BODY MASS INDEX: 24.18 KG/M2 | WEIGHT: 132.2 LBS

## 2019-05-14 PROCEDURE — G0424 PULMONARY REHAB W EXER: HCPCS

## 2019-05-16 ENCOUNTER — HOSPITAL ENCOUNTER (OUTPATIENT)
Dept: CARDIAC REHAB | Age: 74
Discharge: HOME | End: 2019-05-16
Payer: MEDICARE

## 2019-05-16 VITALS — WEIGHT: 132.2 LBS | BODY MASS INDEX: 24.18 KG/M2

## 2019-05-16 PROCEDURE — G0424 PULMONARY REHAB W EXER: HCPCS

## 2019-05-21 ENCOUNTER — HOSPITAL ENCOUNTER (OUTPATIENT)
Dept: CARDIAC REHAB | Age: 74
Discharge: HOME | End: 2019-05-21
Payer: MEDICARE

## 2019-05-21 VITALS — WEIGHT: 130.2 LBS | BODY MASS INDEX: 23.81 KG/M2

## 2019-05-21 PROCEDURE — G0424 PULMONARY REHAB W EXER: HCPCS

## 2019-05-23 ENCOUNTER — HOSPITAL ENCOUNTER (OUTPATIENT)
Dept: CARDIAC REHAB | Age: 74
Discharge: HOME | End: 2019-05-23
Payer: MEDICARE

## 2019-05-23 VITALS — WEIGHT: 131.8 LBS | BODY MASS INDEX: 24.11 KG/M2

## 2019-05-23 PROCEDURE — G0424 PULMONARY REHAB W EXER: HCPCS

## 2019-05-28 ENCOUNTER — HOSPITAL ENCOUNTER (OUTPATIENT)
Dept: CARDIAC REHAB | Age: 74
Discharge: HOME | End: 2019-05-28
Payer: MEDICARE

## 2019-05-28 VITALS — WEIGHT: 132 LBS | BODY MASS INDEX: 24.14 KG/M2

## 2019-05-28 PROCEDURE — G0424 PULMONARY REHAB W EXER: HCPCS

## 2019-05-30 ENCOUNTER — HOSPITAL ENCOUNTER (OUTPATIENT)
Dept: CARDIAC REHAB | Age: 74
Discharge: HOME | End: 2019-05-30
Payer: MEDICARE

## 2019-05-30 VITALS — WEIGHT: 131.8 LBS | BODY MASS INDEX: 24.11 KG/M2

## 2019-05-30 PROCEDURE — G0424 PULMONARY REHAB W EXER: HCPCS

## 2019-06-04 ENCOUNTER — APPOINTMENT (OUTPATIENT)
Dept: CARDIAC REHAB | Age: 74
End: 2019-06-04
Payer: MEDICARE

## 2019-06-06 ENCOUNTER — HOSPITAL ENCOUNTER (OUTPATIENT)
Dept: CARDIAC REHAB | Age: 74
Discharge: HOME | End: 2019-06-06
Payer: MEDICARE

## 2019-06-06 VITALS — WEIGHT: 131 LBS | BODY MASS INDEX: 23.96 KG/M2

## 2019-06-06 PROCEDURE — G0424 PULMONARY REHAB W EXER: HCPCS

## 2019-06-11 ENCOUNTER — HOSPITAL ENCOUNTER (OUTPATIENT)
Dept: CARDIAC REHAB | Age: 74
Discharge: HOME | End: 2019-06-11
Payer: MEDICARE

## 2019-06-11 VITALS — WEIGHT: 129.9 LBS | BODY MASS INDEX: 23.76 KG/M2

## 2019-06-11 PROCEDURE — G0424 PULMONARY REHAB W EXER: HCPCS

## 2019-06-13 ENCOUNTER — HOSPITAL ENCOUNTER (OUTPATIENT)
Dept: CARDIAC REHAB | Age: 74
Discharge: HOME | End: 2019-06-13
Payer: MEDICARE

## 2019-06-13 VITALS — BODY MASS INDEX: 23.85 KG/M2 | WEIGHT: 130.4 LBS

## 2019-06-13 PROCEDURE — G0424 PULMONARY REHAB W EXER: HCPCS

## 2019-06-18 ENCOUNTER — HOSPITAL ENCOUNTER (OUTPATIENT)
Dept: CARDIAC REHAB | Age: 74
Discharge: HOME | End: 2019-06-18
Payer: MEDICARE

## 2019-06-18 VITALS — WEIGHT: 130.4 LBS | BODY MASS INDEX: 23.85 KG/M2

## 2019-06-18 PROCEDURE — G0424 PULMONARY REHAB W EXER: HCPCS

## 2019-06-18 NOTE — CARDIO/PULMONARY
COPD Assessment Tool (CAT)     0-5 I never cough/I cough all the time       Score 2: 
 
 
I have no phlegm in my chest/ My chest is completely full of phlegm  Score: 2 My chest does not feel tight at all/ My chest feels very tight   Score: 1 When I walk up a hill or stairs I am bot breathless/ When I walk up a hill or stairs I am very breathless    Score: 3 I am not limited doing any activities at home/ 
 I am very limited doing activities at home     Score: 4 I am confident leaving my home despite my lung condition/ 
I am not at all confident leaving my home because of my lung condition Score: 2 I sleep soundly/ I don't sleep because of my lung condition   Score: 2 I have lots of energy/ I have no energy at all     Score: 2 Total: 16

## 2019-06-20 ENCOUNTER — HOSPITAL ENCOUNTER (OUTPATIENT)
Dept: CARDIAC REHAB | Age: 74
Discharge: HOME | End: 2019-06-20
Payer: MEDICARE

## 2019-06-20 VITALS — BODY MASS INDEX: 23.85 KG/M2 | WEIGHT: 130.4 LBS

## 2019-06-20 PROCEDURE — G0424 PULMONARY REHAB W EXER: HCPCS

## 2019-06-25 ENCOUNTER — HOSPITAL ENCOUNTER (OUTPATIENT)
Dept: CARDIAC REHAB | Age: 74
Discharge: HOME | End: 2019-06-25
Payer: MEDICARE

## 2019-06-25 VITALS — WEIGHT: 129.8 LBS | BODY MASS INDEX: 23.74 KG/M2

## 2019-06-25 PROCEDURE — G0424 PULMONARY REHAB W EXER: HCPCS

## 2019-06-27 ENCOUNTER — HOSPITAL ENCOUNTER (OUTPATIENT)
Dept: CARDIAC REHAB | Age: 74
Discharge: HOME | End: 2019-06-27
Payer: MEDICARE

## 2019-06-27 VITALS — WEIGHT: 130.4 LBS | BODY MASS INDEX: 23.85 KG/M2

## 2019-06-27 PROCEDURE — G0424 PULMONARY REHAB W EXER: HCPCS

## 2019-07-02 ENCOUNTER — HOSPITAL ENCOUNTER (OUTPATIENT)
Dept: CARDIAC REHAB | Age: 74
Discharge: HOME | End: 2019-07-02
Payer: MEDICARE

## 2019-07-02 VITALS — BODY MASS INDEX: 23.76 KG/M2 | WEIGHT: 129.9 LBS

## 2019-07-02 PROCEDURE — G0424 PULMONARY REHAB W EXER: HCPCS

## 2019-07-02 NOTE — CARDIO/PULMONARY
Saundra Lundberg Completed phase II pulmonary rehab and attended 36 sessions. Saundra Lundberg is interested in maintaining optimal health and will work with Mj Buenrostro MD. Saundra Lundbegr. has improved his endurance and stamina through regular exercise, lost 4 lbs and .5 inches from his waist. Blood pressure is 125/76 and is WNL. He has also improved his nutrition, CAT and depression scores and these were reviewed with patient. Saundra Lundberg plans to continue exercising at a local gym. and has set revised goals that include to continue a Heart Healthy diet and to continue to exercise at home. Mando Delgado, RT 
7/2/2019

## 2019-07-03 ENCOUNTER — HOSPITAL ENCOUNTER (OUTPATIENT)
Dept: CT IMAGING | Age: 74
Discharge: HOME | End: 2019-07-03
Attending: INTERNAL MEDICINE
Payer: MEDICARE

## 2019-07-03 DIAGNOSIS — R91.1 PULMONARY NODULE: ICD-10-CM

## 2019-07-03 PROCEDURE — 71250 CT THORAX DX C-: CPT

## 2019-09-24 PROBLEM — Z01.818 PRE-OP TESTING: Status: RESOLVED | Noted: 2018-04-20 | Resolved: 2019-09-24

## 2020-01-08 ENCOUNTER — HOSPITAL ENCOUNTER (OUTPATIENT)
Dept: CT IMAGING | Age: 75
Discharge: HOME | End: 2020-01-08
Attending: FAMILY MEDICINE
Payer: MEDICARE

## 2020-01-08 DIAGNOSIS — R91.1 LUNG NODULE: ICD-10-CM

## 2020-01-08 PROCEDURE — 71250 CT THORAX DX C-: CPT

## 2021-01-18 ENCOUNTER — TRANSCRIBE ORDER (OUTPATIENT)
Dept: SCHEDULING | Age: 76
End: 2021-01-18

## 2021-01-18 DIAGNOSIS — R93.89 ABNORMAL SCREENING CT OF CHEST: Primary | ICD-10-CM

## 2021-02-15 ENCOUNTER — HOSPITAL ENCOUNTER (OUTPATIENT)
Dept: CT IMAGING | Age: 76
Discharge: HOME | End: 2021-02-15
Attending: INTERNAL MEDICINE
Payer: MEDICARE

## 2021-02-15 DIAGNOSIS — R93.89 ABNORMAL SCREENING CT OF CHEST: ICD-10-CM

## 2021-02-15 PROCEDURE — 71250 CT THORAX DX C-: CPT

## 2022-03-19 PROBLEM — K40.90 RIGHT INGUINAL HERNIA: Status: ACTIVE | Noted: 2018-04-20

## 2022-03-19 PROBLEM — J44.1 COPD EXACERBATION (HCC): Status: ACTIVE | Noted: 2018-11-24

## 2023-05-12 RX ORDER — AMLODIPINE BESYLATE 5 MG/1
TABLET ORAL DAILY
COMMUNITY
Start: 2018-11-29

## 2023-05-12 RX ORDER — MONTELUKAST SODIUM 10 MG/1
10 TABLET ORAL DAILY
COMMUNITY

## 2023-05-12 RX ORDER — ALBUTEROL SULFATE 2.5 MG/3ML
SOLUTION RESPIRATORY (INHALATION) EVERY 4 HOURS PRN
COMMUNITY
Start: 2018-11-28

## 2023-05-12 RX ORDER — POLYETHYLENE GLYCOL 3350 17 G/17G
1 POWDER, FOR SOLUTION ORAL DAILY
COMMUNITY
Start: 2018-11-29

## 2023-05-12 RX ORDER — PRAVASTATIN SODIUM 40 MG
TABLET ORAL
COMMUNITY

## 2023-05-12 RX ORDER — PRIMIDONE 250 MG/1
1000 TABLET ORAL DAILY
COMMUNITY

## (undated) DEVICE — NEEDLE HYPO 18GA L1.5IN PNK S STL HUB POLYPR SHLD REG BVL

## (undated) DEVICE — SYR 10ML LUER LOK 1/5ML GRAD --

## (undated) DEVICE — TRAP SUC MUCOUS 70ML -- MEDICHOICE MEDLINE

## (undated) DEVICE — (D)PREP SKN CHLRAPRP APPL 26ML -- CONVERT TO ITEM 371833

## (undated) DEVICE — Device: Brand: MEDEX

## (undated) DEVICE — CONTAINER SPEC 20 ML LID NEUT BUFF FORMALIN 10 % POLYPR STS

## (undated) DEVICE — SUTURE PROL SZ 0 L30IN NONABSORBABLE BLU L26MM CT-2 1/2 CIR 8412H

## (undated) DEVICE — CATH IV AUTOGRD BC PNK 20GA 25 -- INSYTE

## (undated) DEVICE — KENDALL SCD EXPRESS SLEEVES, KNEE LENGTH, MEDIUM: Brand: KENDALL SCD

## (undated) DEVICE — SUTURE VCRL SZ 3-0 L54IN ABSRB VLT LIGAPAK REEL NDL J205G

## (undated) DEVICE — CUFF BP SOFT LG ADLT 1-TUBE HP --

## (undated) DEVICE — TOWEL 4 PLY TISS 19X30 SUE WHT

## (undated) DEVICE — DRAIN WND PENRS RADPQ 0.25X18 -- CONVERT TO ITEM 360112

## (undated) DEVICE — ROCKER SWITCH PENCIL HOLSTER: Brand: VALLEYLAB

## (undated) DEVICE — SET ADMIN 16ML TBNG L100IN 2 Y INJ SITE IV PIGGY BK DISP

## (undated) DEVICE — STERILE POLYISOPRENE POWDER-FREE SURGICAL GLOVES: Brand: PROTEXIS

## (undated) DEVICE — APPLICATOR BNDG 1MM ADH PREMIERPRO EXOFIN

## (undated) DEVICE — SURGICAL PROCEDURE PACK BASIN MAJ SET CUST NO CAUT

## (undated) DEVICE — STRAINER URIN CALC RNL MSH -- CONVERT TO ITEM 357634

## (undated) DEVICE — DBD-PACK,LAPAROTOMY,2 REINFORCED GOWNS: Brand: MEDLINE

## (undated) DEVICE — SUTURE NRLN 0 L18IN NONABSORBABLE BLK MO-6 L26MM 1/2 CIR C545D

## (undated) DEVICE — Z DISCONTINUED PER MEDLINE LINE GAS SAMPLING O2/CO2 LNG AD 13 FT NSL W/ TBNG FILTERLINE

## (undated) DEVICE — HANDLE LT SNAP ON ULT DURABLE LENS FOR TRUMPF ALC DISPOSABLE

## (undated) DEVICE — NEEDLE HYPO 25GA L1.5IN BVL ORIENTED ECLIPSE

## (undated) DEVICE — BLADE ASSEMB CLP HAIR FINE --

## (undated) DEVICE — Device

## (undated) DEVICE — 1200 GUARD II KIT W/5MM TUBE W/O VAC TUBE: Brand: GUARDIAN

## (undated) DEVICE — SUTURE VCRL SZ 4-0 L27IN ABSRB UD L19MM PS-2 3/8 CIR PRIM J426H

## (undated) DEVICE — KENDALL RADIOLUCENT FOAM MONITORING ELECTRODE RECTANGULAR SHAPE: Brand: KENDALL

## (undated) DEVICE — SUT SLK 0 30IN SH BLK --

## (undated) DEVICE — SOLIDIFIER MEDC 1200ML -- CONVERT TO 356117

## (undated) DEVICE — GOWN,SIRUS,NONRNF,SETINSLV,2XL,18/CS: Brand: MEDLINE

## (undated) DEVICE — (D)SYR 10ML 1/5ML GRAD NSAF -- PKGING CHANGE USE ITEM 338027

## (undated) DEVICE — SNARE ENDOSCP M L240CM W27MM SHTH DIA2.4MM CHN 2.8MM OVL

## (undated) DEVICE — REM POLYHESIVE ADULT PATIENT RETURN ELECTRODE: Brand: VALLEYLAB

## (undated) DEVICE — DEVON™ KNEE AND BODY STRAP 60" X 3" (1.5 M X 7.6 CM): Brand: DEVON

## (undated) DEVICE — SUTURE VCRL SZ 3-0 L27IN ABSRB UD L26MM SH 1/2 CIR J416H

## (undated) DEVICE — NEONATAL-ADULT SPO2 SENSOR: Brand: NELLCOR

## (undated) DEVICE — NON-REM POLYHESIVE PATIENT RETURN ELECTRODE: Brand: VALLEYLAB

## (undated) DEVICE — SYR 3ML LL TIP 1/10ML GRAD --

## (undated) DEVICE — INFECTION CONTROL KIT SYS

## (undated) DEVICE — BASIN EMESIS 500CC ROSE 250/CS 60/PLT: Brand: MEDEGEN MEDICAL PRODUCTS, LLC

## (undated) DEVICE — SPONGE: SPECIALTY PEANUT XR 100/CS: Brand: MEDICAL ACTION INDUSTRIES